# Patient Record
Sex: FEMALE | Race: WHITE
[De-identification: names, ages, dates, MRNs, and addresses within clinical notes are randomized per-mention and may not be internally consistent; named-entity substitution may affect disease eponyms.]

---

## 2017-04-07 ENCOUNTER — HOSPITAL ENCOUNTER (EMERGENCY)
Dept: HOSPITAL 74 - JER | Age: 62
LOS: 1 days | Discharge: HOME | End: 2017-04-08
Payer: COMMERCIAL

## 2017-04-07 VITALS — TEMPERATURE: 98.4 F | DIASTOLIC BLOOD PRESSURE: 76 MMHG | HEART RATE: 92 BPM | SYSTOLIC BLOOD PRESSURE: 144 MMHG

## 2017-04-07 VITALS — BODY MASS INDEX: 42 KG/M2

## 2017-04-07 DIAGNOSIS — E78.00: ICD-10-CM

## 2017-04-07 DIAGNOSIS — J45.909: Primary | ICD-10-CM

## 2017-04-07 DIAGNOSIS — I48.91: ICD-10-CM

## 2017-04-07 DIAGNOSIS — F32.9: ICD-10-CM

## 2017-04-07 DIAGNOSIS — Z79.01: ICD-10-CM

## 2017-04-07 DIAGNOSIS — I10: ICD-10-CM

## 2017-04-07 PROCEDURE — 3E0333Z INTRODUCTION OF ANTI-INFLAMMATORY INTO PERIPHERAL VEIN, PERCUTANEOUS APPROACH: ICD-10-PCS | Performed by: EMERGENCY MEDICINE

## 2017-04-07 PROCEDURE — 3E0F7GC INTRODUCTION OF OTHER THERAPEUTIC SUBSTANCE INTO RESPIRATORY TRACT, VIA NATURAL OR ARTIFICIAL OPENING: ICD-10-PCS | Performed by: EMERGENCY MEDICINE

## 2017-04-07 NOTE — PDOC
History of Present Illness





- General


History Source: Patient, Family


Exam Limitations: No Limitations





- History of Present Illness


Initial Comments: 


04/08/17 00:21


The patient is a 61 year old female with a significant past medical history of 

asthma, Afib, hypertension, and hypercholesterolemia, presenting to the 

Emergency Department with shortness of breath, and nonproductive cough. The 

patients daughter reports that she believes the patient is having an asthma 

attack. The patients daughter reports that the patient ran out of her 

medications of Symbicort and Ventolin. The patient also reports a low grade 

fever, and upper back pain when coughing. The patient admits she is having 

difficulty speaking due to her symptoms.


 


The patient denies nausea, vomiting, and diarrhea. Patient denies body aches, 

or chills. Patient denies chest pain, palpitations, and diaphoresis. Patient 

denies neck pain, or back pain. Patient denies dysuria, urinary frequency, and 

urgency. 





Allergies: Lasix





<Elayne Ruiz - Last Filed: 04/08/17 00:21>





<Patricia Cueva - Last Filed: 04/08/17 03:58>





- General


Chief Complaint: Shortness of Breath


Stated Complaint: ASTHMA


Time Seen by Provider: 04/07/17 23:31





Past History





<Elayne Ruiz - Last Filed: 04/08/17 00:21>





- Past Medical History


Asthma: Yes


Cardiac Disorders: Yes (afib)


HTN: Yes


Hypercholesterolemia: Yes


Psychiatric Problems: Yes (depression)





- Surgical History


Cardiac Surgery: Yes


Cholecystectomy: Yes





- Psycho/Social/Smoking Cessation Hx


Anxiety: No


Suicidal Ideation: No


Smoking History: Never smoked


Have you smoked in the past 12 months: No


Number of Cigarettes Smoked Daily: 6


If you are a former smoker, when did you quit?: 1986


Information on smoking cessation initiated: No


'Breaking Loose' booklet given: 11/01/16


Hx Alcohol Use: No


Drug/Substance Use Hx: No


Substance Use Type: None





<Patricia Cueva - Last Filed: 04/08/17 03:58>





- Past Medical History


Allergies/Adverse Reactions: 


 Allergies











Allergy/AdvReac Type Severity Reaction Status Date / Time


 


furosemide [From Lasix] Allergy Intermediate Itching Verified 04/07/17 22:46











Home Medications: 


Ambulatory Orders





Citalopram Hydrobromide [Celexa -] 10 mg PO DAILY 03/06/17 


Clonazepam [Klonopin -] 0.5 mg PO DAILY MDD 2 03/06/17 


Spironolactone [Aldactone -] 25 mg PO DAILY 03/06/17 


Verapamil HCl [Verapamil ER Pm] 300 mg PO DAILY 03/06/17 


Rivaroxaban [Xarelto] 1 each PO DAILY 04/07/17 


Albuterol 0.083% Nebulizer Sol [Ventolin 0.083% Nebulizer Soln -] 1 neb NEB Q6H 

#40 vial 04/08/17 


Albuterol 0.083% Nebulizer Sol [Ventolin 0.083% Nebulizer Soln -] 1 neb NEB Q6H 

#40 vial 04/08/17 


Albuterol Sulfate [Proair Respiclick] 90 mcg IH Q4H 04/08/17 


Budesonide/Formeterol Fumarate [SYMBICORT 160/4.5mcg -] 1 puff IH DAILY 04/08/ 17 


Prednisone [Deltasone -] 40 mg PO UTDICT #10 tablet 04/08/17 











**Review of Systems





- Review of Systems


Able to Perform ROS?: Yes


Comments:: 


04/08/17 00:22


GENERAL/CONSTITUTIONAL: No fever or chills. No weakness.


HEAD, EYES, EARS, NOSE AND THROAT: No change in vision. No ear pain or 

discharge. No sore throat.


CARDIOVASCULAR: + shortness of breath. No chest pain.


RESPIRATORY: + cough, + wheezing. No hemoptysis.


GASTROINTESTINAL: No nausea, vomiting, diarrhea or constipation.


GENITOURINARY: No dysuria, frequency, or change in urination.


MUSCULOSKELETAL: + back pain with cough. No joint or muscle swelling or pain. 

No neck pain.


SKIN: No rash


NEUROLOGIC: No headache, vertigo, loss of consciousness, or change in strength/

sensation.


ENDOCRINE: No increased thirst. No abnormal weight change.


HEMATOLOGIC/LYMPHATIC: No anemia, easy bleeding, or history of blood clots.


ALLERGIC/IMMUNOLOGIC: No hives or skin allergy.





<Elayne Ruiz - Last Filed: 04/08/17 00:21>





*Physical Exam





- Vital Signs


 Last Vital Signs











Temp Pulse Resp BP Pulse Ox


 


 98.4 F   92 H  24   144/76   98 


 


 04/07/17 22:48  04/07/17 22:48  04/07/17 22:48  04/07/17 22:48  04/07/17 22:48














- Physical Exam


Comments: 


04/08/17 00:23


GENERAL: Awake, alert, and fully oriented, in mild to moderate respiratory 

distress


HEAD: No signs of trauma


EYES: PERRLA, EOMI, sclera anicteric, conjunctiva clear


ENT: Auricles normal inspection, hearing grossly normal, nares patent, 

oropharynx clear without exudates. Moist mucosa


NECK: Normal ROM, supple, no lymphadenopathy, JVD, or masses


LUNGS: Wheezing throughout bilaterally, but good air movement. 


HEART: Regular rate and rhythm, normal S1 and S2, no murmurs, rubs or gallops


ABDOMEN: Soft, nontender, normoactive bowel sounds.  No guarding, no rebound.  

No masses


EXTREMITIES: Normal range of motion, no edema.  No clubbing or cyanosis. No 

cords, erythema, or tenderness


NEUROLOGICAL: Cranial nerves II through XII grossly intact.  Normal speech, 

normal gait


SKIN: Warm, Dry, normal turgor, no rashes or lesions noted.





<Elayne Ruiz - Last Filed: 04/08/17 00:21>





- Vital Signs


 Last Vital Signs











Temp Pulse Resp BP Pulse Ox


 


 98.4 F   92 H  24   144/76   98 


 


 04/07/17 22:48  04/07/17 22:48  04/07/17 22:48  04/07/17 22:48  04/07/17 22:48














<Patricia Cueva - Last Filed: 04/08/17 03:58>





ED Treatment Course





- LABORATORY


CBC & Chemistry Diagram: 


 04/08/17 00:06





 04/08/17 00:06





<Elayne Ruiz - Last Filed: 04/08/17 00:21>





- LABORATORY


CBC & Chemistry Diagram: 


 04/08/17 00:06





 04/08/17 00:06





<Patricia Cueva - Last Filed: 04/08/17 03:58>





Medical Decision Making





- Medical Decision Making





04/08/17 03:49


Pt comes with SOB and asthma exacerbation; however, she is overweight and we 

want to make sure she has no cardiac issues.  Pt is so SOB, that she has been 

unable to eat and she has only been drinking. Pt was treated with solumedrol 

and duoneb and hydrated with NSS; She appears vastly improved. She has normal 

labs and CXR and her EKG demonstrates afib, she is being treated with blood 

thinners for her afib..  





<Patricia Cueva - Last Filed: 04/08/17 03:58>





*DC/Admit/Observation/Transfer





- Attestations


Scribe Attestion: 


04/08/17 00:24


Documentation prepared by Elayne Ruiz, acting as medical scribe for Patricia Cueva MD.





<Elayne Ruiz - Last Filed: 04/08/17 00:21>





- Discharge Dispostion


Admit: No





<Patricia Cueva - Last Filed: 04/08/17 03:58>


Diagnosis at time of Disposition: 


 Asthma





- Discharge Dispostion


Disposition: HOME


Condition at time of disposition: Improved





- Prescriptions


Prescriptions: 


Prednisone [Deltasone -] 40 mg PO UTDICT #10 tablet


Albuterol 0.083% Nebulizer Sol [Ventolin 0.083% Nebulizer Soln -] 1 neb NEB Q6H 

#40 vial


Albuterol 0.083% Nebulizer Sol [Ventolin 0.083% Nebulizer Soln -] 1 neb NEB Q6H 

#40 vial





- Referrals


Referrals: 


Milady Cline [Primary Care Provider] - 





- Patient Instructions


Printed Discharge Instructions:  [facility] Ast[facility] -- Adult

## 2017-04-08 LAB
ALBUMIN SERPL-MCNC: 3.9 G/DL (ref 3.4–5)
ALP SERPL-CCNC: 105 U/L (ref 45–117)
ALT SERPL-CCNC: 21 U/L (ref 12–78)
ANION GAP SERPL CALC-SCNC: 13 MMOL/L (ref 8–16)
APTT BLD: 34 SECONDS (ref 26.9–34.4)
AST SERPL-CCNC: 11 U/L (ref 15–37)
BASOPHILS # BLD: 0.2 % (ref 0–2)
BILIRUB SERPL-MCNC: 0.3 MG/DL (ref 0.2–1)
CALCIUM SERPL-MCNC: 9.4 MG/DL (ref 8.5–10.1)
CO2 SERPL-SCNC: 24 MMOL/L (ref 21–32)
CREAT SERPL-MCNC: 1.1 MG/DL (ref 0.55–1.02)
DEPRECATED RDW RBC AUTO: 15.9 % (ref 11.6–15.6)
EOSINOPHIL # BLD: 0 % (ref 0–4.5)
GLUCOSE SERPL-MCNC: 124 MG/DL (ref 74–106)
INR BLD: 1.16 (ref 0.82–1.09)
MCH RBC QN AUTO: 26.8 PG (ref 25.7–33.7)
MCHC RBC AUTO-ENTMCNC: 32.6 G/DL (ref 32–36)
MCV RBC: 82.3 FL (ref 80–96)
NEUTROPHILS # BLD: 87.9 % (ref 42.8–82.8)
PLATELET # BLD AUTO: 363 K/MM3 (ref 134–434)
PMV BLD: 7.8 FL (ref 7.5–11.1)
PROT SERPL-MCNC: 7.8 G/DL (ref 6.4–8.2)
PT PNL PPP: 12.8 SEC (ref 9.98–11.88)
TROPONIN I SERPL-MCNC: < 0.02 NG/ML (ref 0–0.05)
WBC # BLD AUTO: 12.3 K/MM3 (ref 4–10)

## 2017-04-08 NOTE — EKG
Test Reason : 

Blood Pressure : ***/*** mmHG

Vent. Rate : 091 BPM     Atrial Rate : 113 BPM

   P-R Int : 000 ms          QRS Dur : 088 ms

    QT Int : 398 ms       P-R-T Axes : 000 050 056 degrees

   QTc Int : 489 ms

 

ATRIAL FIBRILLATION WITH PREMATURE VENTRICULAR OR ABERRANTLY CONDUCTED

COMPLEXES



ABNORMAL ECG

WHEN COMPARED WITH ECG OF 06-MAR-2017 14:05,

NONSPECIFIC T WAVE ABNORMALITY, WORSE IN LATERAL LEADS

Confirmed by KELLIE DIALLO, NENO ([id number]) on 4/8/2017 4:19:35 PM

 

Referred By:             Confirmed By:NENO HOPKINS MD

## 2017-04-10 ENCOUNTER — HOSPITAL ENCOUNTER (INPATIENT)
Dept: HOSPITAL 74 - JER | Age: 62
LOS: 9 days | Discharge: HOME | DRG: 141 | End: 2017-04-19
Attending: INTERNAL MEDICINE | Admitting: INTERNAL MEDICINE
Payer: COMMERCIAL

## 2017-04-10 VITALS — BODY MASS INDEX: 42.9 KG/M2

## 2017-04-10 DIAGNOSIS — I48.0: ICD-10-CM

## 2017-04-10 DIAGNOSIS — J45.901: Primary | ICD-10-CM

## 2017-04-10 DIAGNOSIS — I11.0: ICD-10-CM

## 2017-04-10 DIAGNOSIS — Z87.891: ICD-10-CM

## 2017-04-10 DIAGNOSIS — E66.9: ICD-10-CM

## 2017-04-10 DIAGNOSIS — I25.10: ICD-10-CM

## 2017-04-10 DIAGNOSIS — E78.5: ICD-10-CM

## 2017-04-10 DIAGNOSIS — I50.32: ICD-10-CM

## 2017-04-10 DIAGNOSIS — J45.909: ICD-10-CM

## 2017-04-10 LAB
ALBUMIN SERPL-MCNC: 3.9 G/DL (ref 3.4–5)
ALP SERPL-CCNC: 94 U/L (ref 45–117)
ALT SERPL-CCNC: 26 U/L (ref 12–78)
ANION GAP SERPL CALC-SCNC: 15 MMOL/L (ref 8–16)
AST SERPL-CCNC: 20 U/L (ref 15–37)
BASOPHILS # BLD: 0.1 % (ref 0–2)
BILIRUB SERPL-MCNC: 0.4 MG/DL (ref 0.2–1)
CALCIUM SERPL-MCNC: 9.1 MG/DL (ref 8.5–10.1)
CO2 SERPL-SCNC: 21 MMOL/L (ref 21–32)
COCKROFT - GAULT: 88.13
CREAT SERPL-MCNC: 1.2 MG/DL (ref 0.55–1.02)
DEPRECATED RDW RBC AUTO: 15.9 % (ref 11.6–15.6)
EOSINOPHIL # BLD: 0 % (ref 0–4.5)
GLUCOSE SERPL-MCNC: 120 MG/DL (ref 74–106)
MCH RBC QN AUTO: 26.8 PG (ref 25.7–33.7)
MCHC RBC AUTO-ENTMCNC: 33 G/DL (ref 32–36)
MCV RBC: 81.2 FL (ref 80–96)
NEUTROPHILS # BLD: 89.8 % (ref 42.8–82.8)
PLATELET # BLD AUTO: 392 K/MM3 (ref 134–434)
PMV BLD: 7.4 FL (ref 7.5–11.1)
PROT SERPL-MCNC: 7.8 G/DL (ref 6.4–8.2)
TROPONIN I SERPL-MCNC: < 0.02 NG/ML (ref 0–0.05)
TROPONIN I SERPL-MCNC: < 0.02 NG/ML (ref 0–0.05)
WBC # BLD AUTO: 13.5 K/MM3 (ref 4–10)

## 2017-04-10 RX ADMIN — IPRATROPIUM BROMIDE AND ALBUTEROL SULFATE SCH AMP: .5; 3 SOLUTION RESPIRATORY (INHALATION) at 06:50

## 2017-04-10 RX ADMIN — SPIRONOLACTONE SCH: 25 TABLET, FILM COATED ORAL at 22:30

## 2017-04-10 RX ADMIN — IPRATROPIUM BROMIDE AND ALBUTEROL SULFATE SCH AMP: .5; 3 SOLUTION RESPIRATORY (INHALATION) at 23:56

## 2017-04-10 RX ADMIN — BUDESONIDE AND FORMOTEROL FUMARATE DIHYDRATE SCH: 160; 4.5 AEROSOL RESPIRATORY (INHALATION) at 23:23

## 2017-04-10 RX ADMIN — IPRATROPIUM BROMIDE AND ALBUTEROL SULFATE SCH: .5; 3 SOLUTION RESPIRATORY (INHALATION) at 18:26

## 2017-04-10 RX ADMIN — IPRATROPIUM BROMIDE AND ALBUTEROL SULFATE SCH AMP: .5; 3 SOLUTION RESPIRATORY (INHALATION) at 06:15

## 2017-04-10 RX ADMIN — IPRATROPIUM BROMIDE AND ALBUTEROL SULFATE SCH AMP: .5; 3 SOLUTION RESPIRATORY (INHALATION) at 06:25

## 2017-04-10 RX ADMIN — METHYLPREDNISOLONE SODIUM SUCCINATE SCH MG: 125 INJECTION, POWDER, FOR SOLUTION INTRAMUSCULAR; INTRAVENOUS at 18:25

## 2017-04-10 RX ADMIN — IPRATROPIUM BROMIDE AND ALBUTEROL SULFATE SCH AMP: .5; 3 SOLUTION RESPIRATORY (INHALATION) at 06:35

## 2017-04-10 RX ADMIN — MONTELUKAST SODIUM SCH MG: 10 TABLET, COATED ORAL at 22:29

## 2017-04-10 NOTE — CON.CARD
Consult


Consult Specialty:: Cardiology


Referred by:: ER


Reason for Consultation:: SOB, possible CHF





- History of Present Illness


Chief Complaint: SOB


History of Present Illness: 


61 year old woman with a history of chronic asthma, chronic diastolic CHF, 

paroxysmal atrial fibrillation, HTN, HLD, non-obstructive CAD on cardiac cath 1/ 2014, h/o of an episode of possible myocarditis, admitted with sob. Pt seen and 

examined in the ER, sob at that time, despite having recently received 

nebulizer tx. denies any chest pain, no palpitations. no pnd, orthopnea. no 

current LE edema. 





- History Source


History Provided By: Patient, Family Member, Medical Record


Limitations to Obtaining History: No Limitations





- Past Medical History


Cardio/Vascular: Yes: AFIB, CAD, CHF, HTN, Hyperlipdemia


Pulmonary: Yes: Asthma


Gastrointestinal: Yes: Peptic Ulcer Disease


Psych: Yes: Bipolar





- Alcohol/Substance Use


Hx Alcohol Use: No





- Smoking History


Smoking history: Former smoker


Have you smoked in the past 12 months: No


Aproximately how many cigarettes per day: 6


If you are a former smoker, when did you quit?: 1986





- Social History


Usual Living Arrangement: With Child


ADL: Independent


History of Recent Travel: No





Home Medications





- Allergies


Allergies/Adverse Reactions: 


 Allergies











Allergy/AdvReac Type Severity Reaction Status Date / Time


 


furosemide [From Lasix] Allergy Intermediate Itching Verified 04/10/17 06:33














- Home Medications


Home Medications: 


Ambulatory Orders





Citalopram Hydrobromide [Celexa -] 10 mg PO DAILY 03/06/17 


Clonazepam [Klonopin -] 0.5 mg PO DAILY MDD 2 03/06/17 


Spironolactone [Aldactone -] 25 mg PO DAILY 03/06/17 


Verapamil HCl [Verapamil ER Pm] 300 mg PO DAILY 03/06/17 


Rivaroxaban [Xarelto] 1 each PO DAILY 04/07/17 


Albuterol 0.083% Nebulizer Sol [Ventolin 0.083% Nebulizer Soln -] 1 neb NEB Q6H 

#40 vial 04/08/17 


Albuterol Sulfate [Proair Respiclick] 90 mcg IH Q4H 04/08/17 


Budesonide/Formeterol Fumarate [SYMBICORT 160/4.5mcg -] 1 puff IH DAILY 04/08/ 17 


Prednisone [Deltasone -] 40 mg PO UTDICT #10 tablet 04/08/17 











Family Disease History





- Family Disease History


Family History: Denies





Review of Systems





- Review of Systems


Constitutional: reports: Malaise, Weakness


Eyes: denies: No Symptoms, Blind Spots, Blurred Vision, Double Vision, Eye Pain

, Floaters, Photophobia, Recent Change in Vision, Other


HENT: denies: No Symptoms, Difficult Swallowing, Ear Discharge, Ear Pain, 

Epistaxis, Gingival Bleeding, Hearing Loss, Mouth Swelling, Nasal Congestion, 

Ocular Prosthesis, Throat Pain, Toothache, Ringing in Ears, Other


Neck: denies: No Symptoms, Decreased ROM, Lumps, Pain on Movement, Stiffness, 

Swollen Glands, Tenderness, Other


Cardiovascular: reports: Shortness of Breath.  denies: No Symptoms, Chest Pain, 

Edema, Palpitations, Other


Respiratory: reports: Cough, Exercise Intolerance, SOB, SOB on Exertion, 

Wheezing.  denies: No Symptoms, Hemoptysis, Orthopnea, PND, Snoring, Other


Gastrointestinal: denies: No Symptoms, Abdominal Pain, Bloating, Constipation, 

Diarrhea, Dysphagia, Indigestion, Melena, Nausea, Rectal Bleeding, Vomiting, 

Vomiting Blood, Other


Genitourinary: denies: No Symptoms, Burning, Discharge, Dysuria, Flank Pain, 

Frequency, Hematuria, Incontinence, Lesions, Menses, Pain, Testicular Mass, 

Testicular Pain, Testicular Swelling, Urgency, Vaginal Bleeding, Other


Breasts: denies: No Symptoms Reported, See HPI, Breast Implants, Discharge from 

Nipple, Lumps, Pain, Skin Changes, Other


Musculoskeletal: denies: No Symptoms, Back Pain, Crepitus, Decreased ROM, 

Extremity Pain, Joint Pain, Joint Swelling, Muscle Pain, Muscle Cramps, Muscle 

Weakness, Other


Integumentary: denies: No Symptoms, Blister, Bruising, Change in Color, Eczema, 

Erythema, Incision, Lesions, Lump, Pallor, Pruritis, Rash, Wound, Other


Neurological: denies: No Symptoms, Change in LOC, Change in Speech, Confusion, 

Dizziness, Headache, Incoordination, Numbness, Parasthesia, Pre-Existing Deficit

, Seizure, Syncope, Tremors, Unsteady Gait, Weakness, Other


Endocrine: denies: No Symptoms, Excessive Sweating, Flushing, Increased Hunger, 

Increased Thirst, Intolerance to Cold, Intolerance to Heat, Unexplained Weight 

Gain, Unexplained Weight Loss, Other


Hematology/Lymphatic: denies: No Symptoms, Easily Bruised, Excessive Bleeding, 

Swollen Glands, Other


Psychiatric: denies: No Symptoms, Altered Sleep Pattern, Anxiety, Depression, 

Hallucinations, Panic, Paranoia, Suicidal, Other





- Risk Factors


Known Risk Factors: Yes: Hypercholesterolemia, Hypertension


Vital Signs: 


 Vital Signs











Temperature  98.7 F   04/10/17 05:40


 


Pulse Rate  105 H  04/10/17 09:40


 


Respiratory Rate  19   04/10/17 09:40


 


Blood Pressure  107/65   04/10/17 09:40


 


O2 Sat by Pulse Oximetry (%)  99   04/10/17 09:40











Constitutional: Yes: Calm, Mild Distress, Obese


Eyes: Yes: WNL, Conjunctiva Clear, EOM Intact, PERRL


HENT: Yes: WNL, Atraumatic, Normocephalic


Neck: Yes: WNL, Supple, Trachea Midline


Respiratory: Yes: Regular, Cough, Rhonchi, SOB, Wheezes.  No: Rales


Gastrointestinal: Yes: WNL, Normal Bowel Sounds, Soft.  No: Distention, 

Tenderness


Renal/: Yes: WNL


Cardiovascular: Yes: Regular Rate and Rhythm.  No: Bradycardia, Tachycardia, 

Pulse Irregular, Gallop, Rub, Varicosities


JVD: No


Carotid Bruit: No


PMI: Non-Displaced


Heart Sounds: Yes: S1, S2.  No: Split S2, S3, S4, Clicks, Gallop, Rub, Bruit


Murmur: No: Systolic Murmur, Diastolic Murmur


Musculoskeletal: Yes: WNL


Extremities: Yes: WNL


Edema: No


Peripheral Pulses WNL: Yes


Peripheral Pulses: 2+ Left Doralis Pedis, 2+ Right Dorsalis Pedis


Integumentary: Yes: WNL


Neurological: Yes: WNL, Alert, Oriented, Cran Nerves II-XII Intact


...Motor Strength: WNL


Psychiatric: Yes: WNL, Alert, Oriented





- Other Data


Labs, Other Data: 


 Laboratory Tests











  04/10/17 04/10/17 04/10/17





  06:25 06:25 06:25


 


WBC   13.5 H 


 


Hgb   11.2 


 


Hct   34.0 


 


Plt Count   392 


 


Sodium  135 L  


 


Potassium  4.3  


 


Chloride  99  


 


Carbon Dioxide  21  


 


BUN  21 H  


 


Creatinine  1.2 H  


 


Random Glucose  120 H  


 


Calcium  9.1  


 


Total Bilirubin  0.4  D  


 


AST  20  D  


 


ALT  26  D  


 


Alkaline Phosphatase  94  


 


Creatine Kinase   


 


CK-MB (CK-2)   


 


Troponin I   


 


B-Natriuretic Peptide    1398.43 H


 


Total Protein  7.8  


 


Albumin  3.9  














  04/10/17





  06:25


 


WBC 


 


Hgb 


 


Hct 


 


Plt Count 


 


Sodium 


 


Potassium 


 


Chloride 


 


Carbon Dioxide 


 


BUN 


 


Creatinine 


 


Random Glucose 


 


Calcium 


 


Total Bilirubin 


 


AST 


 


ALT 


 


Alkaline Phosphatase 


 


Creatine Kinase  182  D


 


CK-MB (CK-2)  2.256


 


Troponin I  < 0.02


 


B-Natriuretic Peptide 


 


Total Protein 


 


Albumin 











ekg-afib with rVR 120bpm, T inversions V1-V4 possible anterior ischemia, no sig 

change in T wave abnormalities compared to prior ekgs





Echo: Report Reviewed, Image Reviewed


Prior Cardiac Procedures: Cardiac Catheterization





Imaging





- Results


Chest X-ray: Report Reviewed, Image Reviewed


EKG: Report Reviewed, Image Reviewed


Other: Report Reviewed, Image Reviewed





Assessment/Plan


61 year old woman with a history of chronic asthma, chronic diastolic CHF, 

paroxysmal atrial fibrillation, HTN, HLD, non-obstructive CAD on cardiac cath 1/ 2014, h/o of an episode of possible myocarditis, admitted with sob.





SOB-wheezing and rhonchi on exam


-likely exacerbation of asthma


-no pulmonary edema on exam or imaging, sob less likely due to Acute on chronic 

diastolic CHF, although it is possible that her afib with rVR precipitated a 

mild degree of flash pulm edema superimposed on her current asthma exacerbation


-hold off on diuresis for now, tx for asthma exacerbation


-consider pulmonary evaluation


-f/up echo that was ordered for today


-past cardiac work up as below:





Cardiac cath 01/14/2014 [facility]  Nonobstructive coronary artery disease, 

RCA no obstruction, left main no obstruction, mid LAD less than 30%, myocardial 

bridge, left circumflex no obstruction, LVEDP is markedly elevated at 30 mmHg.


Echo 06/08/2016  Normal left ventricular size and function, normal right 

ventricular size and function, no pericardial effusion, mild LVH, insufficient 

TR to measure pressure.


Stress 07/05/2016  Exercise nuclear stress test, performed 4 minutes of Carlos Alberto 

protocol.  Target heart rate was achieved.  Stress ECG was negative, perfusion 

imaging revealed normal perfusion, no evidence of stress-induced ischemia, LVEF 

55%.


Cardiac MRI [facility] 04/15/2014  Normal biventricular size and systolic 

function with no evidence of scar necrosis or edema, moderate mitral 

regurgitation. 





Atrial fibrillation-paroxysmal with RVR


-resume home Verapamil


-did not tolerate bblockers or cardizem in the past


-if needed can use IV cardizem prn for HR control


-at last visit in office pt was on Coumadin but it seems she was seen by 

another doctor more recently and started on Xarelto


-confirm home medication and resume AC





CAD-non-obstructive as above, unlikely active issue


-no chest pain


-ekg abnormal but the abnormalities are chronic, unchanged from prior ekgs


-cont home ASA 81mg daily


-did not tolerate bblocker in the past





HTN-adequately controlled


-cont home medical regimen

## 2017-04-10 NOTE — PDOC
History of Present Illness





- General


Chief Complaint: Respiratory


Stated Complaint: DIFFICULTY BREATHING


Time Seen by Provider: 04/10/17 05:44


History Source: Patient, Family


Exam Limitations: No Limitations





- History of Present Illness


Initial Comments: 





04/10/17 07:05





62yo Female patient w/ PmHx HTN, HLD, Asthma, and Afib presents to ED c/o diff 

breathing, SOB. Patient states she was seen and evaluated in this ED 2 days ago

, discharged with INH and prednisone for symptoms management. Patient returns 

stating she is not getting any better and symptoms have worsened. Patient 

denies CP, Abd pain, Back pain, rash, fever, or any other complaints at this 

time.


Timing/Duration: reports: week


Severity: reports: moderate


Possible Cause: Yes: occasional episodes


Modifying Factors: improves with: albuterol inhaler, albuterol nebulizer


Associated Symptoms: reports: cough, shortness of breath, wheezing.  denies: 

denies symptoms, chest pain/soreness, dizziness, earache, facial pain, fever/

chills, headache, lightheadedness, muscle aches, nasal congestion, nasal 

drainage, sinus infection, sore throat, other





Past History





- Travel


Traveled outside of the country in the last 30 days: No


Close contact w/someone who was outside of country & ill: No





- Past Medical History


Allergies/Adverse Reactions: 


 Allergies











Allergy/AdvReac Type Severity Reaction Status Date / Time


 


furosemide [From Lasix] Allergy Intermediate Itching Verified 04/10/17 06:33











Home Medications: 


Ambulatory Orders





Citalopram Hydrobromide [Celexa -] 10 mg PO DAILY 03/06/17 


Clonazepam [Klonopin -] 0.5 mg PO DAILY MDD 2 03/06/17 


Spironolactone [Aldactone -] 25 mg PO DAILY 03/06/17 


Verapamil HCl [Verapamil ER Pm] 300 mg PO DAILY 03/06/17 


Rivaroxaban [Xarelto] 1 each PO DAILY 04/07/17 


Albuterol 0.083% Nebulizer Sol [Ventolin 0.083% Nebulizer Soln -] 1 neb NEB Q6H 

#40 vial 04/08/17 


Albuterol Sulfate [Proair Respiclick] 90 mcg IH Q4H 04/08/17 


Budesonide/Formeterol Fumarate [SYMBICORT 160/4.5mcg -] 1 puff IH DAILY 04/08/ 17 


Prednisone [Deltasone -] 40 mg PO UTDICT #10 tablet 04/08/17 








Asthma: Yes


Cardiac Disorders: Yes (afib)


HTN: Yes


Hypercholesterolemia: Yes


Psychiatric Problems: Yes (depression)





- Surgical History


Cardiac Surgery: Yes


Cholecystectomy: Yes





- Psycho/Social/Smoking Cessation Hx


Anxiety: No


Suicidal Ideation: No


Smoking History: Former smoker


Have you smoked in the past 12 months: No


Number of Cigarettes Smoked Daily: 6


If you are a former smoker, when did you quit?: 1986


Information on smoking cessation initiated: No


'Breaking Loose' booklet given: 11/01/16


Hx Alcohol Use: No


Drug/Substance Use Hx: No


Substance Use Type: None





Respiratory Specific PMHX





- Complaint Specific PMHX


Angina: No


Bronchitis: No


Pneumonia: No


Pulmonary Embolus: No


TB (Tuberculosis): No





**Review of Systems





- Review of Systems


Able to Perform ROS?: Yes


Is the patient limited English proficient: No


Constitutional: No: Chills, Fever


Respiratory: Yes: Cough, Shortness of Breath.  No: Stridor, Wheezing, 

Productive cough, Hemoptysis


Cardiac (ROS): No: Chest Pain, Lightheadedness, Palpitations, Syncope, Chest 

Tightness


ABD/GI: No: Constipated, Diarrhea, Nausea, Poor Appetite, Poor Fluid Intake, 

Vomiting, Abdominal cramping


: No: Burning, Dysuria, Frequency, Flank Pain, Hematuria, Pain


Musculoskeletal: No: Back Pain


Integumentary: No: Erythema, Rash, Sweating


Neurological: No: Headache, Seizure, Ataxia, Dizziness


All Other Systems: Reviewed and Negative





*Physical Exam





- Vital Signs


 Last Vital Signs











Temp Pulse Resp BP Pulse Ox


 


 98.7 F   117 H  26 H  116/98   100 


 


 04/10/17 05:40  04/10/17 05:40  04/10/17 05:40  04/10/17 05:40  04/10/17 05:40














- Physical Exam


General Appearance: Yes: Nourished, Appropriately Dressed, Moderate Distress.  

No: Apparent Distress, Mild Distress, Severe Distress


HEENT: positive: EOMI, TK, Normal ENT Inspection, Normal Voice, Symmetrical, 

TMs Normal, Pharynx Normal.  negative: Pharyngeal Erythema, Tonsillar Exudate, 

Tonsillar Erythema, Nasal Congestion, Rhinorrhea, Sinus Tenderness, TM Bulging, 

TM Dull, TM Erythema


Neck: positive: Trachea midline, Supple.  negative: Stridor, Lymphadenopathy (R)

, Lymphadenopathy (L)


Respiratory/Chest: positive: Rapid RR, Rales, Wheezing.  negative: Respiratory 

Distress, Accessory Muscle Use, Labored Respiration


Cardiovascular: positive: Tachycardia, Irregularly Irregular


Gastrointestinal/Abdominal: positive: Normal Bowel Sounds, Soft, Distended.  

negative: Guarding, Rebound, Tenderness


Musculoskeletal: positive: Normal Inspection.  negative: CVA Tenderness


Extremity: positive: Normal Capillary Refill, Normal Inspection, Normal Range 

of Motion


Integumentary: positive: Normal Color, Dry, Warm


Neurologic: positive: CNs II-XII NML intact, Fully Oriented, Alert, Normal Mood/

Affect, Normal Response, Motor Strength 5/5





ED Treatment Course





- LABORATORY


CBC & Chemistry Diagram: 


 04/10/17 06:25





 04/10/17 06:25

## 2017-04-10 NOTE — HP
Admitting History and Physical





- Primary Care Physician


PCP: Kush Drake





- Admission


Chief Complaint: chest pain, sob


History of Present Illness: 


-  61 year old female presents to ER with c/o SOB, mild chest pain, asthma 

symptoms, wheezing. she was initially seen 2 days ago in ER with asthma/COPD 

exacerbation where she was treated with steriods and inhalers and discharged to 

home.  Now, pt and daughter at bedside states she feels worse then 2 days ago. 








- Past Medical History


Cardiovascular: Yes: AFIB, CAD, CHF, HTN, Hyperlipdemia


Pulmonary: Yes: Asthma


Gastrointestinal: Yes: Peptic Ulcer Disease


Psych: Yes: Bipolar





- Smoking History


Smoking history: Former smoker


Have you smoked in the past 12 months: No


Aproximately how many cigarettes per day: 6


If you are a former smoker, when did you quit?: 1986





- Alcohol/Substance Use


Hx Alcohol Use: No





- Social History


ADL: Independent


History of Recent Travel: No





Home Medications





- Allergies


Allergies/Adverse Reactions: 


 Allergies











Allergy/AdvReac Type Severity Reaction Status Date / Time


 


furosemide [From Lasix] Allergy Intermediate Itching Verified 04/10/17 06:33














- Home Medications


Home Medications: 


Ambulatory Orders





Citalopram Hydrobromide [Celexa -] 10 mg PO DAILY 03/06/17 


Clonazepam [Klonopin -] 0.5 mg PO DAILY MDD 2 03/06/17 


Spironolactone [Aldactone -] 25 mg PO DAILY 03/06/17 


Verapamil HCl [Verapamil ER Pm] 300 mg PO DAILY 03/06/17 


Rivaroxaban [Xarelto] 1 each PO DAILY 04/07/17 


Albuterol 0.083% Nebulizer Sol [Ventolin 0.083% Nebulizer Soln -] 1 neb NEB Q6H 

#40 vial 04/08/17 


Albuterol Sulfate [Proair Respiclick] 90 mcg IH Q4H 04/08/17 


Budesonide/Formeterol Fumarate [SYMBICORT 160/4.5mcg -] 1 puff IH DAILY 04/08/ 17 


Prednisone [Deltasone -] 40 mg PO UTDICT #10 tablet 04/08/17 











Review of Systems





- Review of Systems


Cardiovascular: reports: Chest Pain


Respiratory: reports: SOB





Physical Examination


Vital Signs: 


 Vital Signs











Temperature  98.2 F   04/10/17 17:40


 


Pulse Rate  119 H  04/10/17 17:40


 


Respiratory Rate  17   04/10/17 17:40


 


Blood Pressure  133/90   04/10/17 17:40


 


O2 Sat by Pulse Oximetry (%)  98   04/10/17 17:40











Constitutional: Yes: No Distress


HENT: Yes: Atraumatic


Neck: Yes: Supple


Cardiovascular: Yes: Regular Rate and Rhythm


Respiratory: Yes: Rhonchi


Gastrointestinal: Yes: Normal Bowel Sounds


Extremities: Yes: WNL


Neurological: Yes: Alert, Oriented





Problem List





- Problems


(1) Acute asthma exacerbation


Assessment/Plan: 


duo nebs


iv steroids


Code(s): J45.901 - UNSPECIFIED ASTHMA WITH (ACUTE) EXACERBATION   





(2) CAD (coronary artery disease)


Code(s): I25.10 - ATHSCL HEART DISEASE OF NATIVE CORONARY ARTERY W/O ANG PCTRS 

  





(3) Chest pain


Code(s): R07.9 - CHEST PAIN, UNSPECIFIED   





(4) CHF exacerbation


Code(s): I50.9 - HEART FAILURE, UNSPECIFIED   Qualifiers: 


     Congestive heart failure type: unspecified congestive heart failure type  

   Qualified Code(s): I50.9 - Heart failure, unspecified  





(5) HLD (hyperlipidemia)


Code(s): E78.5 - HYPERLIPIDEMIA, UNSPECIFIED   





(6) HTN (hypertension)


Code(s): I10 - ESSENTIAL (PRIMARY) HYPERTENSION   








Assessment/Plan


 Laboratory Tests











  04/10/17 04/10/17 04/10/17





  06:25 06:25 06:25


 


WBC   13.5 H 


 


RBC   4.19 


 


Hgb   11.2 


 


Hct   34.0 


 


MCV   81.2 


 


MCHC   33.0 


 


RDW   15.9 H 


 


Plt Count   392 


 


MPV   7.4 L 


 


Neutrophils %   89.8 H 


 


Lymphocytes %   8.0 


 


Monocytes %   2.1 L 


 


Eosinophils %   0.0 


 


Basophils %   0.1 


 


Sodium  135 L  


 


Potassium  4.3  


 


Chloride  99  


 


Carbon Dioxide  21  


 


Anion Gap  15  


 


BUN  21 H  


 


Creatinine  1.2 H  


 


Creat Clearance w eGFR  45.67  


 


Random Glucose  120 H  


 


Calcium  9.1  


 


Total Bilirubin  0.4  D  


 


AST  20  D  


 


ALT  26  D  


 


Alkaline Phosphatase  94  


 


Creatine Kinase   


 


CK-MB (CK-2)   


 


Troponin I   


 


B-Natriuretic Peptide    1398.43 H


 


Total Protein  7.8  


 


Albumin  3.9  














  04/10/17





  06:25


 


WBC 


 


RBC 


 


Hgb 


 


Hct 


 


MCV 


 


MCHC 


 


RDW 


 


Plt Count 


 


MPV 


 


Neutrophils % 


 


Lymphocytes % 


 


Monocytes % 


 


Eosinophils % 


 


Basophils % 


 


Sodium 


 


Potassium 


 


Chloride 


 


Carbon Dioxide 


 


Anion Gap 


 


BUN 


 


Creatinine 


 


Creat Clearance w eGFR 


 


Random Glucose 


 


Calcium 


 


Total Bilirubin 


 


AST 


 


ALT 


 


Alkaline Phosphatase 


 


Creatine Kinase  182  D


 


CK-MB (CK-2)  2.256


 


Troponin I  < 0.02


 


B-Natriuretic Peptide 


 


Total Protein 


 


Albumin 








Active Medications











Generic Name Dose Route Start Last Admin





  Trade Name Freq  PRN Reason Stop Dose Admin


 


Albuterol Sulfate  1 amp 04/10/17 16:59  





  Ventolin 0.083% Nebulizer Soln -  NEB   





  Q4H PRN   





  SHORT OF BREATH/WHEEZING   


 


Albuterol/Ipratropium  1 amp 04/10/17 18:00 04/10/17 18:26





  Duoneb -  NEB   Not Given





  QIDR YU   


 


Methylprednisolone Sodium Succinate  60 mg 04/10/17 18:00 04/10/17 18:25





  Solu-Medrol -  IVPB   60 mg





  Q8H-IV YU   Administration


 


Montelukast Sodium  10 mg 04/10/17 22:00  





  Singulair -  PO   





  HS YU   








Assessment/Plan


61 year old woman with a history of chronic asthma, chronic diastolic CHF, 

paroxysmal atrial fibrillation, HTN, HLD, non-obstructive CAD on cardiac cath 1/ 2014, h/o of an episode of possible myocarditis, admitted with sob.





Acute Asthma Exacerbation


Atrial Fibrillation


CAD


LV Diastolic Dysfunction


HTN


Hyperlipidemia


CHF





PLAN


continue duo nebs


iv steroids


home meds


diuretics











-

## 2017-04-10 NOTE — PDOC
*Physical Exam





- Vital Signs


 Last Vital Signs











Temp Pulse Resp BP Pulse Ox


 


 98.7 F   117 H  26 H  116/98   100 


 


 04/10/17 05:40  04/10/17 05:40  04/10/17 05:40  04/10/17 05:40  04/10/17 05:40














- Physical Exam


General Appearance: Yes: Mild Distress


HEENT: positive: Normal Voice


Neck: positive: Trachea midline


Respiratory/Chest: positive: Accessory Muscle Use, Rales, Wheezing, Other (cough

)


Cardiovascular: positive: Tachycardia, Irregular.  negative: Edema


Musculoskeletal: positive: Normal Inspection


Extremity: positive: Normal Capillary Refill, Normal Inspection, Normal Range 

of Motion


Integumentary: positive: Normal Color, Dry, Warm


Neurologic: positive: Fully Oriented, Alert





**Heart Score/ECG Review





- History


History: Highly suspicious





- Electrocardiogram


EKG: Non specific repolarization disturbance





- Age


Age: 45-65





- Risk Factors


Risk Factors Heart Score: Yes Hx Hypercholesterolemia, Yes Hx Hypertension, Yes 

Smoking History, Yes Hx Obesity


Based on the list above the patient has:: >/=3 risk factors or Hx 

atherosclerotic disease





- Troponin


Troponin: </= normal limit





- Score


Heart Score - Total: 6





- ECG Intrepretation


Rhythm: Irregularly Irregular





ED Treatment Course





- LABORATORY


CBC & Chemistry Diagram: 


 04/10/17 06:25





 04/10/17 06:25





- ADDITIONAL ORDERS


Additional order review: 


 Laboratory  Results











  04/10/17 04/10/17 04/10/17





  06:25 06:25 06:25


 


Sodium    135 L


 


Potassium    4.3


 


Chloride    99


 


Carbon Dioxide    21


 


Anion Gap    15


 


BUN    21 H


 


Creatinine    1.2 H


 


Creat Clearance w eGFR    45.67


 


Random Glucose    120 H


 


Calcium    9.1


 


Total Bilirubin    0.4  D


 


AST    20  D


 


ALT    26  D


 


Alkaline Phosphatase    94


 


Creatine Kinase  182  D  


 


CK-MB (CK-2)  2.256  


 


Troponin I  < 0.02  


 


B-Natriuretic Peptide   1398.43 H 


 


Total Protein    7.8


 


Albumin    3.9








 











  04/10/17





  06:25


 


RBC  4.19


 


MCV  81.2


 


MCHC  33.0


 


RDW  15.9 H


 


MPV  7.4 L


 


Neutrophils %  89.8 H


 


Lymphocytes %  8.0


 


Monocytes %  2.1 L


 


Eosinophils %  0.0


 


Basophils %  0.1














04/10/17 08:02








- RADIOLOGY


Radiology Studies Ordered: 





04/10/17 08:02


Chest CT, neg for infiltrate or effusion





- Medications


Given in the ED: 


ED Medications














Discontinued Medications














Generic Name Dose Route Start Last Admin





  Trade Name Deborah  PRN Reason Stop Dose Admin


 


Albuterol/Ipratropium  1 amp 04/10/17 06:15 04/10/17 06:50





  Duoneb -  NEB 04/10/17 07:01  1 amp





  Q15M YU   Administration


 


Sodium Chloride  500 mls @ 500 mls/hr 04/10/17 06:01 04/10/17 06:23





  Normal Saline -  IV 04/10/17 07:00  500 mls/hr





  ASDIR STA   Administration


 


Methylprednisolone Sodium Succinate  125 mg 04/10/17 06:01 04/10/17 06:24





  Solu-Medrol -  IVPB 04/10/17 06:02  125 mg





  ONCE ONE   Administration














Medical Decision Making





- Medical Decision Making


04/10/17 08:03


A/P: 61 year old female presents to ER with c/o SOB, mild chest pain, asthma 

symptoms, wheezing. she was initially seen 2 days ago in ER with asthma/COPD 

exacerbation where she was treated with steriods and inhalers and discharged to 

home.  Now, pt and daughter at bedside states she feels worse then 2 days ago. 


Received report from night NP Clifton as workup was being completed. 





Labs noted:  elevated BNP noted. 


Chest CT without effusion or infiltrate


EKG: noted in AF rate 121, ST and T wave abnormalities in lateral leads. 





1.  Chest pain, elevated BNP


-  mg given


-Echo


-Admission


2.  SOB, wheezing


-duonebs continue PRN


-pulse ox


-oxygen as needed


3.  Admission 


-Dr. Alva (hospitalist coverage)


 





04/10/17 08:12








*DC/Admit/Observation/Transfer


Diagnosis at time of Disposition: 


 SOB (shortness of breath), Atrial fibrillation, Cough, Asthma





- Discharge Dispostion


Condition at time of disposition: Guarded


Admit: Yes





- Referrals


Referrals: 


Milady Cline [Primary Care Provider] -

## 2017-04-10 NOTE — EKG
Test Reason : 

Blood Pressure : ***/*** mmHG

Vent. Rate : 121 BPM     Atrial Rate : 127 BPM

   P-R Int : 000 ms          QRS Dur : 086 ms

    QT Int : 364 ms       P-R-T Axes : 000 053 268 degrees

   QTc Int : 516 ms

 

ATRIAL FIBRILLATION WITH RAPID VENTRICULAR RESPONSE



ABNORMAL ECG

WHEN COMPARED WITH ECG OF 08-APR-2017 02:19,

NO SIGNIFICANT CHANGE WAS FOUND

Confirmed by JEVON GONZALEZ MD ([id number]) on 4/10/2017 11:20:10 AM

 

Referred By:             Confirmed By:JEVON GONZALEZ MD

## 2017-04-10 NOTE — CON.PULM
Consult


Consult Specialty:: PULMONARY


Referred by:: Dr. Drake


Reason for Consultation:: shortness of breath





- History of Present Illness


Chief Complaint: shortness of breath


History of Present Illness: 


62yo female with h/o HTN, hyperlipidemia, CAD, atrial fibrillation, asthma 

since childhood, LV diastolic dysfunction, bipolar disorder who presents with 

worsening shortness of breath. She was seen in the ER 4/7, treated for asthma 

exacerbation, discharged on prednisone 20mg BID but without improvement. No 

fevers, chills or sweats. No sick contacts or recent travel. She did receive 

her flu vaccine this year. She is maintained on Symbicort and Albuterol at home

, has about 1 exacerbation per year requiring steroids. Does not know her best 

peak flow. No chest pain or palpitations. 








- History Source


History Provided By: Patient, Family Member, Medical Record


Limitations to Obtaining History: Language Barrier





- Past Medical History


Cardio/Vascular: Yes: AFIB, CAD, CHF, HTN, Hyperlipdemia


Pulmonary: Yes: Asthma


Gastrointestinal: Yes: Peptic Ulcer Disease


Psych: Yes: Bipolar





- Alcohol/Substance Use


Hx Alcohol Use: No





- Smoking History


Smoking history: Former smoker


Have you smoked in the past 12 months: No


Aproximately how many cigarettes per day: 6


If you are a former smoker, when did you quit?: 1986





- Social History


Usual Living Arrangement: With Child


ADL: Independent


History of Recent Travel: No





Home Medications





- Allergies


Allergies/Adverse Reactions: 


 Allergies











Allergy/AdvReac Type Severity Reaction Status Date / Time


 


furosemide [From Lasix] Allergy Intermediate Itching Verified 04/10/17 06:33














- Home Medications


Home Medications: 


Ambulatory Orders





Citalopram Hydrobromide [Celexa -] 10 mg PO DAILY 03/06/17 


Clonazepam [Klonopin -] 0.5 mg PO DAILY MDD 2 03/06/17 


Spironolactone [Aldactone -] 25 mg PO DAILY 03/06/17 


Verapamil HCl [Verapamil ER Pm] 300 mg PO DAILY 03/06/17 


Rivaroxaban [Xarelto] 1 each PO DAILY 04/07/17 


Albuterol 0.083% Nebulizer Sol [Ventolin 0.083% Nebulizer Soln -] 1 neb NEB Q6H 

#40 vial 04/08/17 


Albuterol Sulfate [Proair Respiclick] 90 mcg IH Q4H 04/08/17 


Budesonide/Formeterol Fumarate [SYMBICORT 160/4.5mcg -] 1 puff IH DAILY 04/08/ 17 


Prednisone [Deltasone -] 40 mg PO UTDICT #10 tablet 04/08/17 











Review of Systems





- Review of Systems


Constitutional: denies: Chills, Fever


Eyes: denies: Recent Change in Vision


HENT: denies: Nasal Congestion, Throat Pain


Neck: denies: Stiffness, Tenderness


Cardiovascular: reports: Shortness of Breath.  denies: Chest Pain, Edema, 

Palpitations


Respiratory: reports: Cough, Exercise Intolerance, SOB, SOB on Exertion, 

Wheezing.  denies: Hemoptysis


Gastrointestinal: denies: Abdominal Pain, Nausea, Vomiting


Genitourinary: denies: Dysuria, Hematuria


Neurological: denies: Dizziness, Headache





Physical Exam


Vital Sings: 


 Vital Signs











Temperature  98.9 F   04/10/17 13:40


 


Pulse Rate  102 H  04/10/17 13:40


 


Respiratory Rate  18   04/10/17 13:40


 


Blood Pressure  114/72   04/10/17 13:40


 


O2 Sat by Pulse Oximetry (%)  98   04/10/17 13:40











Constitutional: Yes: Calm, Other (frequent coughing)


Eyes: Yes: Conjunctiva Clear, EOM Intact


HENT: Yes: Atraumatic, Normocephalic


Neck: Yes: Supple, Trachea Midline


Cardiovascular: Yes: Regular Rate and Rhythm


Respiratory: Yes: Rhonchi, Wheezes


...Clubbing: No


Gastrointestinal: Yes: Normal Bowel Sounds, Soft, Abdomen, Obese.  No: 

Tenderness


Edema: No


Peripheral Pulses WNL: Yes





Imaging





- Results


Cat Scan: Report Reviewed, Image Reviewed (no infiltrates)





Problem List





- Problems


(1) Acute asthma exacerbation


Code(s): J45.901 - UNSPECIFIED ASTHMA WITH (ACUTE) EXACERBATION   





(2) Atrial fibrillation


Code(s): I48.91 - UNSPECIFIED ATRIAL FIBRILLATION   





(3) CAD (coronary artery disease)


Code(s): I25.10 - ATHSCL HEART DISEASE OF NATIVE CORONARY ARTERY W/O ANG PCTRS 

  





(4) HLD (hyperlipidemia)


Code(s): E78.5 - HYPERLIPIDEMIA, UNSPECIFIED   





(5) HTN (hypertension)


Code(s): I10 - ESSENTIAL (PRIMARY) HYPERTENSION   








Assessment/Plan


Acute Asthma Exacerbation


Atrial Fibrillation


CAD


LV Diastolic Dysfunction


HTN


Hyperlipidemia





-  IV medrol


-  inhaled bronchodilators standing and PRN


-  monitor daily peak flow


-  O2 as needed to keep SpO2 >90%


-  rate control


-  continue anticoagulation


-  will follow





Thank you for this consult


Luis Murillo MD

## 2017-04-11 LAB
ALBUMIN SERPL-MCNC: 3.5 G/DL (ref 3.4–5)
ALP SERPL-CCNC: 76 U/L (ref 45–117)
ALT SERPL-CCNC: 28 U/L (ref 12–78)
ANION GAP SERPL CALC-SCNC: 13 MMOL/L (ref 8–16)
AST SERPL-CCNC: 13 U/L (ref 15–37)
BASOPHILS # BLD: 0.1 % (ref 0–2)
BILIRUB SERPL-MCNC: 0.5 MG/DL (ref 0.2–1)
CALCIUM SERPL-MCNC: 8.6 MG/DL (ref 8.5–10.1)
CO2 SERPL-SCNC: 24 MMOL/L (ref 21–32)
COCKROFT - GAULT: 105.33
CREAT SERPL-MCNC: 1 MG/DL (ref 0.55–1.02)
DEPRECATED RDW RBC AUTO: 16.2 % (ref 11.6–15.6)
EOSINOPHIL # BLD: 0 % (ref 0–4.5)
GLUCOSE SERPL-MCNC: 130 MG/DL (ref 74–106)
MCH RBC QN AUTO: 27.2 PG (ref 25.7–33.7)
MCHC RBC AUTO-ENTMCNC: 33.2 G/DL (ref 32–36)
MCV RBC: 82 FL (ref 80–96)
NEUTROPHILS # BLD: 88.9 % (ref 42.8–82.8)
PLATELET # BLD AUTO: 321 K/MM3 (ref 134–434)
PMV BLD: 7.4 FL (ref 7.5–11.1)
PROT SERPL-MCNC: 6.8 G/DL (ref 6.4–8.2)
TROPONIN I SERPL-MCNC: < 0.02 NG/ML (ref 0–0.05)
WBC # BLD AUTO: 9.6 K/MM3 (ref 4–10)

## 2017-04-11 RX ADMIN — VERAPAMIL HYDROCHLORIDE SCH MG: 240 TABLET, FILM COATED, EXTENDED RELEASE ORAL at 11:57

## 2017-04-11 RX ADMIN — METHYLPREDNISOLONE SODIUM SUCCINATE SCH MG: 125 INJECTION, POWDER, FOR SOLUTION INTRAMUSCULAR; INTRAVENOUS at 17:38

## 2017-04-11 RX ADMIN — IPRATROPIUM BROMIDE AND ALBUTEROL SULFATE SCH AMP: .5; 3 SOLUTION RESPIRATORY (INHALATION) at 23:46

## 2017-04-11 RX ADMIN — SPIRONOLACTONE SCH MG: 25 TABLET, FILM COATED ORAL at 10:00

## 2017-04-11 RX ADMIN — BUDESONIDE AND FORMOTEROL FUMARATE DIHYDRATE SCH PUFF: 160; 4.5 AEROSOL RESPIRATORY (INHALATION) at 14:00

## 2017-04-11 RX ADMIN — VERAPAMIL HYDROCHLORIDE SCH MG: 240 TABLET, FILM COATED, EXTENDED RELEASE ORAL at 21:37

## 2017-04-11 RX ADMIN — METHYLPREDNISOLONE SODIUM SUCCINATE SCH MG: 125 INJECTION, POWDER, FOR SOLUTION INTRAMUSCULAR; INTRAVENOUS at 02:40

## 2017-04-11 RX ADMIN — IPRATROPIUM BROMIDE AND ALBUTEROL SULFATE SCH AMP: .5; 3 SOLUTION RESPIRATORY (INHALATION) at 18:09

## 2017-04-11 RX ADMIN — METHYLPREDNISOLONE SODIUM SUCCINATE SCH MG: 125 INJECTION, POWDER, FOR SOLUTION INTRAMUSCULAR; INTRAVENOUS at 10:01

## 2017-04-11 RX ADMIN — IPRATROPIUM BROMIDE AND ALBUTEROL SULFATE SCH AMP: .5; 3 SOLUTION RESPIRATORY (INHALATION) at 12:00

## 2017-04-11 RX ADMIN — IPRATROPIUM BROMIDE AND ALBUTEROL SULFATE SCH AMP: .5; 3 SOLUTION RESPIRATORY (INHALATION) at 06:31

## 2017-04-11 RX ADMIN — RIVAROXABAN SCH MG: 20 TABLET, FILM COATED ORAL at 20:04

## 2017-04-11 RX ADMIN — CITALOPRAM HYDROBROMIDE SCH MG: 10 TABLET ORAL at 10:01

## 2017-04-11 RX ADMIN — MONTELUKAST SODIUM SCH MG: 10 TABLET, COATED ORAL at 21:37

## 2017-04-11 NOTE — PN
Progress Note, Physician


History of Present Illness: 


PULLMONARY





ALERT,STILL DYSPNEIC,CONGESTED





- Current Medication List


Current Medications: 


Active Medications





Albuterol Sulfate (Ventolin 0.083% Nebulizer Soln -)  1 amp NEB Q4H PRN


   PRN Reason: SHORT OF BREATH/WHEEZING


Albuterol/Ipratropium (Duoneb -)  1 amp NEB QIDR [facility]


   Last Admin: 04/11/17 12:00 Dose:  1 amp


Budesonide/Formoterol Fumarate (Symbicort 160/4.5mcg -)  1 puff IH DAILY [facility]


   Last Admin: 04/10/17 23:23 Dose:  Not Given


Citalopram Hydrobromide (Celexa -)  10 mg PO DAILY [facility]


   Last Admin: 04/11/17 10:01 Dose:  10 mg


Clonazepam (Klonopin -)  0.5 mg PO DAILY [facility]


   Last Admin: 04/11/17 10:01 Dose:  0.5 mg


Methylprednisolone Sodium Succinate (Solu-Medrol -)  60 mg IVPB Q8H-IV [facility]


   Last Admin: 04/11/17 10:01 Dose:  60 mg


Montelukast Sodium (Singulair -)  10 mg PO HS [facility]


   Last Admin: 04/10/17 22:29 Dose:  10 mg


Rivaroxaban (Xarelto -)  20 mg PO DAILY [facility]


Spironolactone (Aldactone -)  25 mg PO DAILY [facility]


   Last Admin: 04/11/17 10:00 Dose:  25 mg


Verapamil HCl (Calan Sr -)  240 mg PO BID [facility]


   Last Admin: 04/11/17 11:57 Dose:  240 mg











- Objective


Vital Signs: 


 Vital Signs











Temperature  98.5 F   04/11/17 10:00


 


Pulse Rate  89   04/11/17 10:00


 


Respiratory Rate  20   04/11/17 10:00


 


Blood Pressure  111/74   04/11/17 10:00


 


O2 Sat by Pulse Oximetry (%)  92 L  04/11/17 09:00











Constitutional: Yes: Well Nourished, Calm


Eyes: Yes: WNL


HENT: Yes: WNL, Tonsillar Exudate


Cardiovascular: Yes: Pulse Irregular, S1, S2


Respiratory: Yes: Wheezes (BILATERAL WHEEZES)


Gastrointestinal: Yes: Normal Bowel Sounds, Soft


Extremities: Yes: WNL


Edema: No


Labs: 


 CBC, BMP





 04/11/17 05:35 





 04/11/17 06:00 











Assessment/Plan


Problem List





- Problems


(1) Acute asthma exacerbation


Code(s): J45.901 - UNSPECIFIED ASTHMA WITH (ACUTE) EXACERBATION   





(2) Atrial fibrillation


Code(s): I48.91 - UNSPECIFIED ATRIAL FIBRILLATION   





(3) CAD (coronary artery disease)


Code(s): I25.10 - ATHSCL HEART DISEASE OF NATIVE CORONARY ARTERY W/O ANG PCTRS 

  





(4) HLD (hyperlipidemia)


Code(s): E78.5 - HYPERLIPIDEMIA, UNSPECIFIED   





(5) HTN (hypertension)


Code(s): I10 - ESSENTIAL (PRIMARY) HYPERTENSION   








Assessment/Plan


Acute Asthma Exacerbation


Atrial Fibrillation


CAD


LV Diastolic Dysfunction


HTN


Hyperlipidemia





-  IV medrol same dose


-  inhaled bronchodilators standing and PRN


-  monitor daily peak flow


-  O2 as needed to keep SpO2 >90%


-  rate control


-  anticoagulation











DR ALONZO

## 2017-04-11 NOTE — PN
Progress Note, Physician





- Current Medication List


Current Medications: 


Active Medications





Albuterol Sulfate (Ventolin 0.083% Nebulizer Soln -)  1 amp NEB Q4H PRN


   PRN Reason: SHORT OF BREATH/WHEEZING


Albuterol/Ipratropium (Duoneb -)  1 amp NEB QIDR [facility]


   Last Admin: 04/11/17 18:09 Dose:  1 amp


Budesonide/Formoterol Fumarate (Symbicort 160/4.5mcg -)  1 puff IH DAILY [facility]


   Last Admin: 04/11/17 14:00 Dose:  1 puff


Citalopram Hydrobromide (Celexa -)  10 mg PO DAILY [facility]


   Last Admin: 04/11/17 10:01 Dose:  10 mg


Clonazepam (Klonopin -)  0.5 mg PO DAILY [facility]


   Last Admin: 04/11/17 10:01 Dose:  0.5 mg


Methylprednisolone Sodium Succinate (Solu-Medrol -)  60 mg IVPB Q8H-IV [facility]


   Last Admin: 04/11/17 17:38 Dose:  60 mg


Montelukast Sodium (Singulair -)  10 mg PO HS [facility]


   Last Admin: 04/10/17 22:29 Dose:  10 mg


Rivaroxaban (Xarelto -)  20 mg PO DAILY [facility]


Spironolactone (Aldactone -)  25 mg PO DAILY [facility]


   Last Admin: 04/11/17 10:00 Dose:  25 mg


Verapamil HCl (Calan Sr -)  240 mg PO BID [facility]


   Last Admin: 04/11/17 11:57 Dose:  240 mg











- Objective


Vital Signs: 


 Vital Signs











Temperature  98.2 F   04/11/17 16:35


 


Pulse Rate  82   04/11/17 16:35


 


Respiratory Rate  20   04/11/17 16:35


 


Blood Pressure  116/63   04/11/17 16:35


 


O2 Sat by Pulse Oximetry (%)  92 L  04/11/17 09:00











Constitutional: Yes: No Distress


HENT: Yes: Atraumatic


Neck: Yes: Supple


Cardiovascular: Yes: Regular Rate and Rhythm


Respiratory: Yes: Rhonchi, Wheezes


Gastrointestinal: Yes: Normal Bowel Sounds


Extremities: Yes: WNL


Neurological: Yes: Alert, Oriented


Labs: 


 CBC, BMP





 04/11/17 05:35 





 04/11/17 06:00 











Problem List





- Problems


(1) Acute asthma exacerbation


Assessment/Plan: 


duo nebs


iv steroids


Code(s): RADHA45.901 - UNSPECIFIED ASTHMA WITH (ACUTE) EXACERBATION   





(2) CAD (coronary artery disease)


Assessment/Plan: 


stable


on meds


Code(s): I25.10 - ATHSCL HEART DISEASE OF NATIVE CORONARY ARTERY W/O ANG PCTRS 

  





(3) Chest pain


Assessment/Plan: 


resolved


atypical


Code(s): R07.9 - CHEST PAIN, UNSPECIFIED   





(4) CHF exacerbation


Code(s): I50.9 - HEART FAILURE, UNSPECIFIED   Qualifiers: 


     Congestive heart failure type: unspecified congestive heart failure type  

   Qualified Code(s): I50.9 - Heart failure, unspecified  





(5) HLD (hyperlipidemia)


Assessment/Plan: 


on meds stable


Code(s): E78.5 - HYPERLIPIDEMIA, UNSPECIFIED   





(6) HTN (hypertension)


Assessment/Plan: 


on meds stable


Code(s): I10 - ESSENTIAL (PRIMARY) HYPERTENSION

## 2017-04-11 NOTE — PN
Progress Note, Physician


History of Present Illness: 


seen and examined today in [facility]. no overnight events. no new complaints. states 

her sob is slightly improved. no palpitations. slight lightheadedness when 

walking.








- Current Medication List


Current Medications: 


Active Medications





Albuterol Sulfate (Ventolin 0.083% Nebulizer Soln -)  1 amp NEB Q4H PRN


   PRN Reason: SHORT OF BREATH/WHEEZING


Albuterol/Ipratropium (Duoneb -)  1 amp NEB QIDR [facility]


   Last Admin: 04/11/17 06:31 Dose:  1 amp


Budesonide/Formoterol Fumarate (Symbicort 160/4.5mcg -)  1 puff IH DAILY [facility]


   Last Admin: 04/10/17 23:23 Dose:  Not Given


Citalopram Hydrobromide (Celexa -)  10 mg PO DAILY [facility]


   Last Admin: 04/11/17 10:01 Dose:  10 mg


Clonazepam (Klonopin -)  0.5 mg PO DAILY [facility]


   Last Admin: 04/11/17 10:01 Dose:  0.5 mg


Methylprednisolone Sodium Succinate (Solu-Medrol -)  60 mg IVPB Q8H-IV [facility]


   Last Admin: 04/11/17 10:01 Dose:  60 mg


Montelukast Sodium (Singulair -)  10 mg PO HS [facility]


   Last Admin: 04/10/17 22:29 Dose:  10 mg


Non-Formulary Medication (Rivaroxaban [Xarelto])  1 each PO DAILY [facility]


Spironolactone (Aldactone -)  25 mg PO DAILY [facility]


   Last Admin: 04/11/17 10:00 Dose:  25 mg


Verapamil HCl (Calan Sr -)  240 mg PO BID [facility]











- Objective


Vital Signs: 


 Vital Signs











Temperature  97.8 F   04/11/17 06:10


 


Pulse Rate  88   04/11/17 06:10


 


Respiratory Rate  20   04/11/17 06:10


 


Blood Pressure  127/72   04/11/17 06:10


 


O2 Sat by Pulse Oximetry (%)  96   04/11/17 01:53











Constitutional: Yes: No Distress, Calm, Obese


Eyes: Yes: WNL, Conjunctiva Clear, EOM Intact, PERRL


HENT: Yes: WNL, Atraumatic, Normocephalic


Neck: Yes: WNL, Supple, Trachea Midline


Cardiovascular: Yes: Pulse Irregular, S1, S2.  No: Bradycardia, Tachycardia, 

Bruit, JVD, Gallop, Murmur, Rub, S3, S4, Varicosities


Respiratory: Yes: Regular, Rhonchi, Wheezes.  No: Rales


Gastrointestinal: Yes: WNL, Normal Bowel Sounds, Soft.  No: Distention, 

Tenderness


Musculoskeletal: Yes: WNL


Extremities: Yes: WNL


Edema: No


Peripheral Pulses WNL: Yes


Peripheral Pulses: Left Doralis Pedis: 2+, Right Dorsalis Pedis: 2+


Integumentary: Yes: WNL


Neurological: Yes: WNL, Alert, Oriented, Cran Nerves II-XII Intact


...Motor Strength: WNL


Psychiatric: Yes: WNL, Alert, Oriented


Labs: 


 CBC, BMP





 04/11/17 05:35 





 04/11/17 06:00 











- ....Imaging


Chest X-ray: Report Reviewed, Image Reviewed


EKG: Report Reviewed, Image Reviewed


Other: Report Reviewed, Image Reviewed (tele-Afib currently HR controlled, 

periods of RVR especially with minimal exertion)





Assessment/Plan


61 year old woman with a history of chronic asthma, chronic diastolic CHF, 

paroxysmal atrial fibrillation, HTN, HLD, non-obstructive CAD on cardiac cath 1/ 2014, h/o of an episode of possible myocarditis, admitted with sob.





SOB-wheezing and rhonchi on exam c/w exacerbation of asthma


-no sign of decompensated CHF


-hold off on diuresis for now, tx for asthma exacerbation


-echo not done yesterday, can be done as outpatient





Atrial fibrillation-paroxysmal with periods of RVR especially with exertion in 

the hospital room


-uptitrate Verapamil ER to 240mg bid


-if HR is not adequately controlled will plan to add Digoxin at a reduced dose 

as Verapamil can increase the digoxin levels


-did not tolerate bblockers or cardizem in the past


-if needed can use IV cardizem prn for HR control


-cont Xarelto-started recently by a different cardiologist (Dr. Farfan) at [facility]





CAD-non-obstructive as above, unlikely active issue


-no chest pain


-ekg abnormal but the abnormalities are chronic, unchanged from prior ekgs


-cont home ASA 81mg daily


-did not tolerate bblocker in the past





HTN-adequately controlled


-cont current medical regimen

## 2017-04-12 RX ADMIN — BUDESONIDE AND FORMOTEROL FUMARATE DIHYDRATE SCH PUFF: 160; 4.5 AEROSOL RESPIRATORY (INHALATION) at 09:46

## 2017-04-12 RX ADMIN — VERAPAMIL HYDROCHLORIDE SCH MG: 240 TABLET, FILM COATED, EXTENDED RELEASE ORAL at 21:54

## 2017-04-12 RX ADMIN — METHYLPREDNISOLONE SODIUM SUCCINATE SCH MG: 125 INJECTION, POWDER, FOR SOLUTION INTRAMUSCULAR; INTRAVENOUS at 18:22

## 2017-04-12 RX ADMIN — SPIRONOLACTONE SCH MG: 25 TABLET, FILM COATED ORAL at 09:42

## 2017-04-12 RX ADMIN — IPRATROPIUM BROMIDE AND ALBUTEROL SULFATE SCH AMP: .5; 3 SOLUTION RESPIRATORY (INHALATION) at 23:52

## 2017-04-12 RX ADMIN — CITALOPRAM HYDROBROMIDE SCH MG: 10 TABLET ORAL at 09:42

## 2017-04-12 RX ADMIN — RIVAROXABAN SCH MG: 20 TABLET, FILM COATED ORAL at 09:45

## 2017-04-12 RX ADMIN — MONTELUKAST SODIUM SCH MG: 10 TABLET, COATED ORAL at 21:54

## 2017-04-12 RX ADMIN — METHYLPREDNISOLONE SODIUM SUCCINATE SCH MG: 125 INJECTION, POWDER, FOR SOLUTION INTRAMUSCULAR; INTRAVENOUS at 09:44

## 2017-04-12 RX ADMIN — VERAPAMIL HYDROCHLORIDE SCH MG: 240 TABLET, FILM COATED, EXTENDED RELEASE ORAL at 09:45

## 2017-04-12 RX ADMIN — IPRATROPIUM BROMIDE AND ALBUTEROL SULFATE SCH AMP: .5; 3 SOLUTION RESPIRATORY (INHALATION) at 19:04

## 2017-04-12 RX ADMIN — IPRATROPIUM BROMIDE AND ALBUTEROL SULFATE SCH AMP: .5; 3 SOLUTION RESPIRATORY (INHALATION) at 06:49

## 2017-04-12 RX ADMIN — METHYLPREDNISOLONE SODIUM SUCCINATE SCH MG: 125 INJECTION, POWDER, FOR SOLUTION INTRAMUSCULAR; INTRAVENOUS at 01:28

## 2017-04-12 RX ADMIN — IPRATROPIUM BROMIDE AND ALBUTEROL SULFATE SCH AMP: .5; 3 SOLUTION RESPIRATORY (INHALATION) at 11:53

## 2017-04-12 RX ADMIN — GUAIFENESIN AND DEXTROMETHORPHAN HYDROBROMIDE PRN ML: 100; 10 SOLUTION ORAL at 20:10

## 2017-04-12 NOTE — PN
Progress Note, Physician


History of Present Illness: 


PULMONARY





ALERT,STILL C/O COUGH,LESS DYSPNEIC





- Current Medication List


Current Medications: 


Active Medications





Albuterol Sulfate (Ventolin 0.083% Nebulizer Soln -)  1 amp NEB Q4H PRN


   PRN Reason: SHORT OF BREATH/WHEEZING


Albuterol/Ipratropium (Duoneb -)  1 amp NEB QIDR [facility]


   Last Admin: 04/12/17 11:53 Dose:  1 amp


Budesonide/Formoterol Fumarate (Symbicort 160/4.5mcg -)  1 puff IH DAILY [facility]


   Last Admin: 04/12/17 09:46 Dose:  1 puff


Citalopram Hydrobromide (Celexa -)  10 mg PO DAILY [facility]


   Last Admin: 04/12/17 09:42 Dose:  10 mg


Clonazepam (Klonopin -)  0.5 mg PO DAILY [facility]


   Last Admin: 04/12/17 09:43 Dose:  Not Given


Methylprednisolone Sodium Succinate (Solu-Medrol -)  60 mg IVPB Q8H-IV [facility]


   Last Admin: 04/12/17 09:44 Dose:  60 mg


Montelukast Sodium (Singulair -)  10 mg PO HS [facility]


   Last Admin: 04/11/17 21:37 Dose:  10 mg


Rivaroxaban (Xarelto -)  20 mg PO DAILY [facility]


   Last Admin: 04/12/17 09:45 Dose:  20 mg


Spironolactone (Aldactone -)  25 mg PO DAILY [facility]


   Last Admin: 04/12/17 09:42 Dose:  25 mg


Verapamil HCl (Calan Sr -)  240 mg PO BID [facility]


   Last Admin: 04/12/17 09:45 Dose:  240 mg











- Objective


Vital Signs: 


 Vital Signs











Temperature  97.8 F   04/12/17 14:01


 


Pulse Rate  83   04/12/17 14:01


 


Respiratory Rate  18   04/12/17 14:01


 


Blood Pressure  112/65   04/12/17 14:01


 


O2 Sat by Pulse Oximetry (%)  100   04/12/17 08:47











Constitutional: Yes: Well Nourished, Calm


Eyes: Yes: WNL


HENT: Yes: WNL


Neck: Yes: WNL


Cardiovascular: Yes: Pulse Irregular, S1, S2


Respiratory: Yes: Rhonchi (ROCKY WHEEZES AND RHONCHI), Wheezes


Gastrointestinal: Yes: WNL


Extremities: Yes: WNL


Edema: No


Labs: 


 





Assessment/Plan


Problem List





- Problems


(1) Acute asthma exacerbation


Code(s): J45.901 - UNSPECIFIED ASTHMA WITH (ACUTE) EXACERBATION   





(2) Atrial fibrillation


Code(s): I48.91 - UNSPECIFIED ATRIAL FIBRILLATION   





(3) CAD (coronary artery disease)


Code(s): I25.10 - ATHSCL HEART DISEASE OF NATIVE CORONARY ARTERY W/O ANG PCTRS 

  





(4) HLD (hyperlipidemia)


Code(s): E78.5 - HYPERLIPIDEMIA, UNSPECIFIED   





(5) HTN (hypertension)


Code(s): I10 - ESSENTIAL (PRIMARY) HYPERTENSION   








Assessment/Plan


Acute Asthma Exacerbation


Atrial Fibrillation


CAD


LV Diastolic Dysfunction


HTN


Hyperlipidemia





-  IV medrol same dose


-  inhaled bronchodilators standing and PRN


-  monitor daily peak flow


-  O2 as needed to keep SpO2 >90%


-  rate control


-  anticoagulation











DR LAONZO

## 2017-04-12 NOTE — PN
Progress Note, Physician


History of Present Illness: 


seen and examined today in [facility]. again states she feels slightly better since 

yesterday. as per RN notes pt was up and walking without tachycardia. no 

palpitations. no overnight events no new complaints.








- Current Medication List


Current Medications: 


Active Medications





Albuterol Sulfate (Ventolin 0.083% Nebulizer Soln -)  1 amp NEB Q4H PRN


   PRN Reason: SHORT OF BREATH/WHEEZING


Albuterol/Ipratropium (Duoneb -)  1 amp NEB QIDR [facility]


   Last Admin: 04/12/17 11:53 Dose:  1 amp


Budesonide/Formoterol Fumarate (Symbicort 160/4.5mcg -)  1 puff IH DAILY [facility]


   Last Admin: 04/12/17 09:46 Dose:  1 puff


Citalopram Hydrobromide (Celexa -)  10 mg PO DAILY [facility]


   Last Admin: 04/12/17 09:42 Dose:  10 mg


Clonazepam (Klonopin -)  0.5 mg PO DAILY [facility]


   Last Admin: 04/12/17 09:43 Dose:  Not Given


Methylprednisolone Sodium Succinate (Solu-Medrol -)  60 mg IVPB Q8H-IV [facility]


   Last Admin: 04/12/17 09:44 Dose:  60 mg


Montelukast Sodium (Singulair -)  10 mg PO HS [facility]


   Last Admin: 04/11/17 21:37 Dose:  10 mg


Rivaroxaban (Xarelto -)  20 mg PO DAILY [facility]


   Last Admin: 04/12/17 09:45 Dose:  20 mg


Spironolactone (Aldactone -)  25 mg PO DAILY [facility]


   Last Admin: 04/12/17 09:42 Dose:  25 mg


Verapamil HCl (Calan Sr -)  240 mg PO BID [facility]


   Last Admin: 04/12/17 09:45 Dose:  240 mg











- Objective


Vital Signs: 


 Vital Signs











Temperature  97.8 F   04/12/17 14:01


 


Pulse Rate  83   04/12/17 14:01


 


Respiratory Rate  18   04/12/17 14:01


 


Blood Pressure  112/65   04/12/17 14:01


 


O2 Sat by Pulse Oximetry (%)  100   04/12/17 08:47











Constitutional: Yes: No Distress, Calm, Obese


Eyes: Yes: Conjunctiva Clear, EOM Intact, PERRL


HENT: Yes: Atraumatic, Normocephalic


Neck: Yes: Supple, Trachea Midline


Cardiovascular: Yes: Pulse Irregular, S1, S2.  No: Bradycardia, Tachycardia, 

Bruit, JVD, Gallop, Murmur, Rub, S3, S4, Varicosities


Respiratory: Yes: Regular, Wheezes.  No: Rales, Rhonchi, SOB


Gastrointestinal: Yes: WNL, Normal Bowel Sounds, Soft.  No: Distention, 

Tenderness


Musculoskeletal: Yes: WNL


Extremities: Yes: WNL


Edema: No


Peripheral Pulses WNL: Yes


Peripheral Pulses: Left Doralis Pedis: 2+, Right Dorsalis Pedis: 2+


Integumentary: Yes: WNL


Neurological: Yes: WNL, Alert, Oriented, Cran Nerves II-XII Intact


...Motor Strength: WNL


Psychiatric: Yes: WNL, Alert, Oriented


Labs: 


 CBC, BMP





 04/11/17 05:35 





 04/11/17 06:00 











- ....Imaging


Chest X-ray: Report Reviewed, Image Reviewed


EKG: Report Reviewed, Image Reviewed


Other: Report Reviewed, Image Reviewed (tele-Afib, still intermittent episodes 

of RVR but better controlled)





Assessment/Plan


61 year old woman with a history of chronic asthma, chronic diastolic CHF, 

paroxysmal atrial fibrillation, HTN, HLD, non-obstructive CAD on cardiac cath 1/ 2014, h/o of an episode of possible myocarditis, admitted with sob.





SOB-wheezing and rhonchi on exam c/w exacerbation of asthma


-improving clinically


-still no sign of decompensated CHF


-hold off on diuresis for now, tx for asthma exacerbation


-echo can be done as outpatient





Atrial fibrillation-paroxysmal with periods of RVR especially with exertion in 

the hospital room


-HR likely exacerbated by SOB/Asthma exacerbation


-uptitrated Verapamil ER to 240mg bid


-HR is better controlled including when up and walking


-will cont with Verapamil alone for now and re-evaluate after asthma improves


-if HR is not adequately controlled will plan to add Digoxin at a reduced dose 

as Verapamil can increase the digoxin levels


-did not tolerate bblockers or cardizem in the past


-if needed can use IV cardizem prn for HR control


-cont Xarelto


-cont tele throughout admission





CAD-non-obstructive as above, stable


-no chest pain


-ekg abnormal but the abnormalities are chronic, unchanged from prior ekgs


-cont home ASA 81mg daily


-did not tolerate bblocker in the past





HTN-adequately controlled, low normal


-tolerating uptitrated dose of verapamil


-cont current medical regimen

## 2017-04-13 RX ADMIN — IPRATROPIUM BROMIDE AND ALBUTEROL SULFATE SCH AMP: .5; 3 SOLUTION RESPIRATORY (INHALATION) at 17:20

## 2017-04-13 RX ADMIN — GUAIFENESIN AND DEXTROMETHORPHAN HYDROBROMIDE PRN ML: 100; 10 SOLUTION ORAL at 05:58

## 2017-04-13 RX ADMIN — MONTELUKAST SODIUM SCH MG: 10 TABLET, COATED ORAL at 21:46

## 2017-04-13 RX ADMIN — VERAPAMIL HYDROCHLORIDE SCH MG: 240 TABLET, FILM COATED, EXTENDED RELEASE ORAL at 09:27

## 2017-04-13 RX ADMIN — SPIRONOLACTONE SCH MG: 25 TABLET, FILM COATED ORAL at 09:27

## 2017-04-13 RX ADMIN — METHYLPREDNISOLONE SODIUM SUCCINATE SCH MG: 125 INJECTION, POWDER, FOR SOLUTION INTRAMUSCULAR; INTRAVENOUS at 02:33

## 2017-04-13 RX ADMIN — GUAIFENESIN AND DEXTROMETHORPHAN HYDROBROMIDE PRN ML: 100; 10 SOLUTION ORAL at 02:34

## 2017-04-13 RX ADMIN — RIVAROXABAN SCH MG: 20 TABLET, FILM COATED ORAL at 09:27

## 2017-04-13 RX ADMIN — CITALOPRAM HYDROBROMIDE SCH MG: 10 TABLET ORAL at 09:27

## 2017-04-13 RX ADMIN — IPRATROPIUM BROMIDE AND ALBUTEROL SULFATE SCH AMP: .5; 3 SOLUTION RESPIRATORY (INHALATION) at 06:45

## 2017-04-13 RX ADMIN — BUDESONIDE AND FORMOTEROL FUMARATE DIHYDRATE SCH PUFF: 160; 4.5 AEROSOL RESPIRATORY (INHALATION) at 09:26

## 2017-04-13 RX ADMIN — VERAPAMIL HYDROCHLORIDE SCH MG: 240 TABLET, FILM COATED, EXTENDED RELEASE ORAL at 21:41

## 2017-04-13 RX ADMIN — METHYLPREDNISOLONE SODIUM SUCCINATE SCH MG: 125 INJECTION, POWDER, FOR SOLUTION INTRAMUSCULAR; INTRAVENOUS at 17:54

## 2017-04-13 RX ADMIN — GUAIFENESIN AND DEXTROMETHORPHAN HYDROBROMIDE PRN ML: 100; 10 SOLUTION ORAL at 18:51

## 2017-04-13 RX ADMIN — IPRATROPIUM BROMIDE AND ALBUTEROL SULFATE SCH AMP: .5; 3 SOLUTION RESPIRATORY (INHALATION) at 12:13

## 2017-04-13 RX ADMIN — GUAIFENESIN AND DEXTROMETHORPHAN HYDROBROMIDE PRN ML: 100; 10 SOLUTION ORAL at 09:31

## 2017-04-13 RX ADMIN — METHYLPREDNISOLONE SODIUM SUCCINATE SCH MG: 125 INJECTION, POWDER, FOR SOLUTION INTRAMUSCULAR; INTRAVENOUS at 09:27

## 2017-04-13 RX ADMIN — IPRATROPIUM BROMIDE AND ALBUTEROL SULFATE SCH AMP: .5; 3 SOLUTION RESPIRATORY (INHALATION) at 23:33

## 2017-04-13 NOTE — PN
Progress Note, Physician


History of Present Illness: 


coughing





- Current Medication List


Current Medications: 


Active Medications





Albuterol Sulfate (Ventolin 0.083% Nebulizer Soln -)  1 amp NEB Q4H PRN


   PRN Reason: SHORT OF BREATH/WHEEZING


Albuterol/Ipratropium (Duoneb -)  1 amp NEB QIDR [facility]


   Last Admin: 04/13/17 17:20 Dose:  1 amp


Budesonide/Formoterol Fumarate (Symbicort 160/4.5mcg -)  1 puff IH DAILY [facility]


   Last Admin: 04/13/17 09:26 Dose:  1 puff


Citalopram Hydrobromide (Celexa -)  10 mg PO DAILY [facility]


   Last Admin: 04/13/17 09:27 Dose:  10 mg


Clonazepam (Klonopin -)  0.5 mg PO HS [facility]


   Last Admin: 04/12/17 21:54 Dose:  0.5 mg


Guaifenesin (Diabetic Tussin Dm -)  10 ml PO Q4H PRN


   PRN Reason: COUGH


   Last Admin: 04/13/17 09:31 Dose:  10 ml


Montelukast Sodium (Singulair -)  10 mg PO HS [facility]


   Last Admin: 04/12/17 21:54 Dose:  10 mg


Rivaroxaban (Xarelto -)  20 mg PO DAILY [facility]


   Last Admin: 04/13/17 09:27 Dose:  20 mg


Spironolactone (Aldactone -)  25 mg PO DAILY [facility]


   Last Admin: 04/13/17 09:27 Dose:  25 mg


Verapamil HCl (Calan Sr -)  240 mg PO BID [facility]


   Last Admin: 04/13/17 09:27 Dose:  240 mg











- Objective


Vital Signs: 


 Vital Signs











Temperature  97.9 F   04/13/17 17:25


 


Pulse Rate  88   04/13/17 17:25


 


Respiratory Rate  20   04/13/17 17:25


 


Blood Pressure  104/57   04/13/17 17:25


 


O2 Sat by Pulse Oximetry (%)  99   04/13/17 14:05











Constitutional: Yes: No Distress


HENT: Yes: Atraumatic


Neck: Yes: Supple


Cardiovascular: Yes: Regular Rate and Rhythm


Respiratory: Yes: Rhonchi, Wheezes


Gastrointestinal: Yes: Normal Bowel Sounds


Extremities: Yes: WNL


Neurological: Yes: Alert, Oriented


Labs: 


 CBC, BMP





 04/11/17 05:35 





 04/11/17 06:00 











Problem List





- Problems


(1) Acute asthma exacerbation


Assessment/Plan: 


duo nebs


iv steroids


Code(s): J45.901 - UNSPECIFIED ASTHMA WITH (ACUTE) EXACERBATION   





(2) CAD (coronary artery disease)


Assessment/Plan: 


stable


on meds


Code(s): I25.10 - ATHSCL HEART DISEASE OF NATIVE CORONARY ARTERY W/O ANG PCTRS 

  





(3) Chest pain


Assessment/Plan: 


resolved


atypical


Code(s): R07.9 - CHEST PAIN, UNSPECIFIED   





(4) CHF exacerbation


Code(s): I50.9 - HEART FAILURE, UNSPECIFIED   Qualifiers: 


     Congestive heart failure type: unspecified congestive heart failure type  

   Qualified Code(s): I50.9 - Heart failure, unspecified  





(5) HLD (hyperlipidemia)


Assessment/Plan: 


on meds stable


Code(s): E78.5 - HYPERLIPIDEMIA, UNSPECIFIED   





(6) HTN (hypertension)


Assessment/Plan: 


on meds stable


Code(s): I10 - ESSENTIAL (PRIMARY) HYPERTENSION

## 2017-04-13 NOTE — PN
Progress Note, Physician


History of Present Illness: 


seen and examined today in [facility]. states she is feeling much better today, almost 

back to baseline. no overnight events. no new complaints.








- Current Medication List


Current Medications: 


Active Medications





Albuterol Sulfate (Ventolin 0.083% Nebulizer Soln -)  1 amp NEB Q4H PRN


   PRN Reason: SHORT OF BREATH/WHEEZING


Albuterol/Ipratropium (Duoneb -)  1 amp NEB QIDR [facility]


   Last Admin: 04/13/17 06:45 Dose:  1 amp


Budesonide/Formoterol Fumarate (Symbicort 160/4.5mcg -)  1 puff IH DAILY [facility]


   Last Admin: 04/13/17 09:26 Dose:  1 puff


Citalopram Hydrobromide (Celexa -)  10 mg PO DAILY [facility]


   Last Admin: 04/13/17 09:27 Dose:  10 mg


Clonazepam (Klonopin -)  0.5 mg PO HS [facility]


   Last Admin: 04/12/17 21:54 Dose:  0.5 mg


Guaifenesin (Diabetic Tussin Dm -)  10 ml PO Q4H PRN


   PRN Reason: COUGH


   Last Admin: 04/13/17 09:31 Dose:  10 ml


Methylprednisolone Sodium Succinate (Solu-Medrol -)  60 mg IVPB Q8H-IV [facility]


   Last Admin: 04/13/17 09:27 Dose:  60 mg


Montelukast Sodium (Singulair -)  10 mg PO HS [facility]


   Last Admin: 04/12/17 21:54 Dose:  10 mg


Rivaroxaban (Xarelto -)  20 mg PO DAILY [facility]


   Last Admin: 04/13/17 09:27 Dose:  20 mg


Spironolactone (Aldactone -)  25 mg PO DAILY [facility]


   Last Admin: 04/13/17 09:27 Dose:  25 mg


Verapamil HCl (Calan Sr -)  240 mg PO BID [facility]


   Last Admin: 04/13/17 09:27 Dose:  240 mg











- Objective


Vital Signs: 


 Vital Signs











Temperature  97.6 F   04/13/17 05:59


 


Pulse Rate  79   04/13/17 05:59


 


Respiratory Rate  18   04/13/17 05:59


 


Blood Pressure  126/70   04/13/17 05:59


 


O2 Sat by Pulse Oximetry (%)  95   04/12/17 22:00











Constitutional: Yes: No Distress, Calm, Obese


Eyes: Yes: WNL, Conjunctiva Clear, EOM Intact, PERRL


HENT: Yes: WNL, Atraumatic, Normocephalic


Neck: Yes: WNL, Supple, Trachea Midline


Cardiovascular: Yes: Pulse Irregular, Murmur, S1, S2.  No: Bradycardia, 

Tachycardia, Bruit, JVD, Gallop, Rub, S3, S4, Varicosities


Respiratory: Yes: Regular, Rhonchi, Wheezes.  No: Rales


Gastrointestinal: Yes: WNL, Normal Bowel Sounds, Soft.  No: Distention, 

Tenderness


Musculoskeletal: Yes: WNL


Extremities: Yes: WNL


Edema: No


Peripheral Pulses WNL: Yes


Peripheral Pulses: Left Doralis Pedis: 2+, Right Dorsalis Pedis: 2+


Integumentary: Yes: WNL


Neurological: Yes: WNL, Alert, Oriented, Cran Nerves II-XII Intact


...Motor Strength: WNL


Psychiatric: Yes: WNL


Labs: 


 CBC, BMP





 04/11/17 05:35 





 04/11/17 06:00 











- ....Imaging


Chest X-ray: Report Reviewed, Image Reviewed


EKG: Report Reviewed, Image Reviewed


Other: Report Reviewed, Image Reviewed (tele-AFib, HR controlled, brief 

episodes RVR yesterday afternoon)





Assessment/Plan


61 year old woman with a history of chronic asthma, chronic diastolic CHF, 

paroxysmal atrial fibrillation, HTN, HLD, non-obstructive CAD on cardiac cath 1/ 2014, h/o of an episode of possible myocarditis, admitted with sob.





SOB-asthma exacerbation


-significantly improved


-no sign of decompensated CHF


-did not require diuresis


-echo can be done as outpatient





Atrial fibrillation-paroxysmal, HR currently well controlled, brief episodes of 

RVR yesterday


-HR likely exacerbated by SOB/Asthma exacerbation and has improved with 

improvement in SOB


-cont Verapamil ER 240mg bid with plan to dc home on this dose


-pt should have close outpatient cardiology follow up, i discussed with her and 

her daughter this am that I would recc f/up within 1-2 weeks with a plan for an 

outpatient event monitor to further evaluate AFib control. I believe they will f

/up with Dr. Farfan at [facility], but I have offered f/up with me


-cont Tete


-ok from a cardiac standpoint for discharge home, if remains inpatient can dc 

tele





CAD-non-obstructive as above, stable


-no chest pain


-ekg abnormal but the abnormalities are chronic, unchanged from prior ekgs


-cont home ASA 81mg daily


-did not tolerate bblocker in the past





HTN-adequately controlled, low normal


-tolerating uptitrated dose of verapamil


-cont current medical regimen

## 2017-04-14 RX ADMIN — SPIRONOLACTONE SCH MG: 25 TABLET, FILM COATED ORAL at 10:31

## 2017-04-14 RX ADMIN — GUAIFENESIN AND DEXTROMETHORPHAN HYDROBROMIDE PRN ML: 100; 10 SOLUTION ORAL at 17:16

## 2017-04-14 RX ADMIN — IPRATROPIUM BROMIDE AND ALBUTEROL SULFATE SCH AMP: .5; 3 SOLUTION RESPIRATORY (INHALATION) at 12:10

## 2017-04-14 RX ADMIN — METHYLPREDNISOLONE SODIUM SUCCINATE SCH MG: 40 INJECTION, POWDER, FOR SOLUTION INTRAMUSCULAR; INTRAVENOUS at 10:33

## 2017-04-14 RX ADMIN — VERAPAMIL HYDROCHLORIDE SCH MG: 240 TABLET, FILM COATED, EXTENDED RELEASE ORAL at 22:09

## 2017-04-14 RX ADMIN — METHYLPREDNISOLONE SODIUM SUCCINATE SCH MG: 40 INJECTION, POWDER, FOR SOLUTION INTRAMUSCULAR; INTRAVENOUS at 17:16

## 2017-04-14 RX ADMIN — BUDESONIDE AND FORMOTEROL FUMARATE DIHYDRATE SCH PUFF: 160; 4.5 AEROSOL RESPIRATORY (INHALATION) at 10:34

## 2017-04-14 RX ADMIN — GUAIFENESIN AND DEXTROMETHORPHAN HYDROBROMIDE PRN ML: 100; 10 SOLUTION ORAL at 10:32

## 2017-04-14 RX ADMIN — MONTELUKAST SODIUM SCH MG: 10 TABLET, COATED ORAL at 22:09

## 2017-04-14 RX ADMIN — IPRATROPIUM BROMIDE AND ALBUTEROL SULFATE SCH AMP: .5; 3 SOLUTION RESPIRATORY (INHALATION) at 07:10

## 2017-04-14 RX ADMIN — IPRATROPIUM BROMIDE AND ALBUTEROL SULFATE SCH AMP: .5; 3 SOLUTION RESPIRATORY (INHALATION) at 18:41

## 2017-04-14 RX ADMIN — VERAPAMIL HYDROCHLORIDE SCH MG: 240 TABLET, FILM COATED, EXTENDED RELEASE ORAL at 10:31

## 2017-04-14 RX ADMIN — CITALOPRAM HYDROBROMIDE SCH MG: 10 TABLET ORAL at 10:31

## 2017-04-14 RX ADMIN — RIVAROXABAN SCH MG: 20 TABLET, FILM COATED ORAL at 10:32

## 2017-04-14 RX ADMIN — METHYLPREDNISOLONE SODIUM SUCCINATE SCH MG: 40 INJECTION, POWDER, FOR SOLUTION INTRAMUSCULAR; INTRAVENOUS at 01:41

## 2017-04-14 NOTE — PN
Progress Note (short form)





- Note


Progress Note: 


PULMONARY





VSS/AFEBRILE





ANICTERIC


DIFFUSE B/L WHEEZE 


S1S2


BS+ OBESE


NO EDEMA





LABS/MEDS/NOTES/IMAGING/MICRO REVIEWED





Acute Asthma Exacerbation


Atrial Fibrillation


CAD


LV Diastolic Dysfunction


HTN


Hyperlipidemia





-  IV medrol same dose


-  inhaled bronchodilators standing and PRN


-  monitor daily peak flow


-  O2 as needed to keep SpO2 >90%


-  rate control


-  anticoagulation





MARIA ALEJANDRA SWAN MD

## 2017-04-14 NOTE — PN
Progress Note, Physician


History of Present Illness: 


coughing





- Current Medication List


Current Medications: 


Active Medications





Albuterol Sulfate (Ventolin 0.083% Nebulizer Soln -)  1 amp NEB Q4H PRN


   PRN Reason: SHORT OF BREATH/WHEEZING


Albuterol/Ipratropium (Duoneb -)  1 amp NEB QIDR [facility]


   Last Admin: 04/14/17 18:41 Dose:  1 amp


Budesonide/Formoterol Fumarate (Symbicort 160/4.5mcg -)  1 puff IH DAILY [facility]


   Last Admin: 04/14/17 10:34 Dose:  1 puff


Citalopram Hydrobromide (Celexa -)  10 mg PO DAILY [facility]


   Last Admin: 04/14/17 10:31 Dose:  10 mg


Clonazepam (Klonopin -)  0.5 mg PO HS [facility]


   Last Admin: 04/13/17 21:41 Dose:  0.5 mg


Guaifenesin (Diabetic Tussin Dm -)  10 ml PO Q4H PRN


   PRN Reason: COUGH


   Last Admin: 04/14/17 17:16 Dose:  10 ml


Methylprednisolone Sodium Succinate (Solu-Medrol -)  40 mg IVPB Q8H-IV [facility]


   Last Admin: 04/14/17 17:16 Dose:  40 mg


Montelukast Sodium (Singulair -)  10 mg PO HS [facility]


   Last Admin: 04/13/17 21:46 Dose:  10 mg


Rivaroxaban (Xarelto -)  20 mg PO DAILY [facility]


   Last Admin: 04/14/17 10:32 Dose:  20 mg


Spironolactone (Aldactone -)  25 mg PO DAILY [facility]


   Last Admin: 04/14/17 10:31 Dose:  25 mg


Verapamil HCl (Calan Sr -)  240 mg PO BID [facility]


   Last Admin: 04/14/17 10:31 Dose:  240 mg











- Objective


Vital Signs: 


 Vital Signs











Temperature  98.2 F   04/14/17 18:00


 


Pulse Rate  102 H  04/14/17 18:00


 


Respiratory Rate  16   04/14/17 18:00


 


Blood Pressure  119/76   04/14/17 18:00


 


O2 Sat by Pulse Oximetry (%)  96   04/13/17 21:00











Constitutional: Yes: No Distress


HENT: Yes: Atraumatic


Neck: Yes: Supple


Cardiovascular: Yes: Regular Rate and Rhythm


Respiratory: Yes: Wheezes


Gastrointestinal: Yes: Normal Bowel Sounds


Extremities: Yes: WNL


Neurological: Yes: Alert, Oriented


Labs: 


 CBC, BMP





 04/11/17 05:35 





 04/11/17 06:00 











Problem List





- Problems


(1) Acute asthma exacerbation


Assessment/Plan: 


duo nebs


iv steroids


Code(s): J45.901 - UNSPECIFIED ASTHMA WITH (ACUTE) EXACERBATION   





(2) CAD (coronary artery disease)


Assessment/Plan: 


stable


on meds


Code(s): I25.10 - ATHSCL HEART DISEASE OF NATIVE CORONARY ARTERY W/O ANG PCTRS 

  





(3) Chest pain


Assessment/Plan: 


resolved


atypical


Code(s): R07.9 - CHEST PAIN, UNSPECIFIED   





(4) CHF exacerbation


Assessment/Plan: 


diuretics


stable


Code(s): I50.9 - HEART FAILURE, UNSPECIFIED   Qualifiers: 


     Congestive heart failure type: unspecified congestive heart failure type  

   Qualified Code(s): I50.9 - Heart failure, unspecified  





(5) HLD (hyperlipidemia)


Assessment/Plan: 


on meds stable


Code(s): E78.5 - HYPERLIPIDEMIA, UNSPECIFIED   





(6) HTN (hypertension)


Assessment/Plan: 


on meds stable


Code(s): I10 - ESSENTIAL (PRIMARY) HYPERTENSION   








Assessment/Plan














Assessment/Plan


61 year old woman with a history of chronic asthma, chronic diastolic CHF, 

paroxysmal atrial fibrillation, HTN, HLD, non-obstructive CAD on cardiac cath 1/ 2014, h/o of an episode of possible myocarditis, admitted with sob.





Acute Asthma Exacerbation


Atrial Fibrillation


CAD


LV Diastolic Dysfunction


HTN


Hyperlipidemia


CHF





PLAN


continue duo nebs


iv steroids


home meds


diuretics











-

## 2017-04-15 LAB
ALBUMIN SERPL-MCNC: 3.2 G/DL (ref 3.4–5)
ALP SERPL-CCNC: 72 U/L (ref 45–117)
ALT SERPL-CCNC: 33 U/L (ref 12–78)
ANION GAP SERPL CALC-SCNC: 10 MMOL/L (ref 8–16)
AST SERPL-CCNC: 12 U/L (ref 15–37)
BILIRUB SERPL-MCNC: 0.7 MG/DL (ref 0.2–1)
CALCIUM SERPL-MCNC: 8.7 MG/DL (ref 8.5–10.1)
CO2 SERPL-SCNC: 30 MMOL/L (ref 21–32)
COCKROFT - GAULT: 109.56
CREAT SERPL-MCNC: 1 MG/DL (ref 0.55–1.02)
DEPRECATED RDW RBC AUTO: 16.1 % (ref 11.6–15.6)
GLUCOSE SERPL-MCNC: 141 MG/DL (ref 74–106)
HYPOCHROMIA BLD QL SMEAR: (no result)
MCH RBC QN AUTO: 27.1 PG (ref 25.7–33.7)
MCHC RBC AUTO-ENTMCNC: 32.5 G/DL (ref 32–36)
MCV RBC: 83.4 FL (ref 80–96)
NEUTROPHILS # BLD: 88 % (ref 42.8–82.8)
PLATELET # BLD AUTO: 268 K/MM3 (ref 134–434)
PLATELET # BLD EST: ADEQUATE 10*3/UL
PLATELET COMMENT2: (no result)
PMV BLD: 7.5 FL (ref 7.5–11.1)
POIKILOCYTOSIS BLD QL SMEAR: (no result)
PROT SERPL-MCNC: 6.1 G/DL (ref 6.4–8.2)
WBC # BLD AUTO: 14 K/MM3 (ref 4–10)

## 2017-04-15 RX ADMIN — SPIRONOLACTONE SCH MG: 25 TABLET, FILM COATED ORAL at 09:54

## 2017-04-15 RX ADMIN — BUDESONIDE AND FORMOTEROL FUMARATE DIHYDRATE SCH PUFF: 160; 4.5 AEROSOL RESPIRATORY (INHALATION) at 09:55

## 2017-04-15 RX ADMIN — METHYLPREDNISOLONE SODIUM SUCCINATE SCH MG: 40 INJECTION, POWDER, FOR SOLUTION INTRAMUSCULAR; INTRAVENOUS at 22:04

## 2017-04-15 RX ADMIN — METHYLPREDNISOLONE SODIUM SUCCINATE SCH MG: 40 INJECTION, POWDER, FOR SOLUTION INTRAMUSCULAR; INTRAVENOUS at 01:28

## 2017-04-15 RX ADMIN — CEFTRIAXONE SCH MLS/HR: 1 INJECTION, POWDER, FOR SOLUTION INTRAMUSCULAR; INTRAVENOUS at 22:03

## 2017-04-15 RX ADMIN — IPRATROPIUM BROMIDE AND ALBUTEROL SULFATE SCH AMP: .5; 3 SOLUTION RESPIRATORY (INHALATION) at 11:40

## 2017-04-15 RX ADMIN — IPRATROPIUM BROMIDE AND ALBUTEROL SULFATE SCH AMP: .5; 3 SOLUTION RESPIRATORY (INHALATION) at 00:46

## 2017-04-15 RX ADMIN — MONTELUKAST SODIUM SCH MG: 10 TABLET, COATED ORAL at 22:04

## 2017-04-15 RX ADMIN — IPRATROPIUM BROMIDE AND ALBUTEROL SULFATE SCH AMP: .5; 3 SOLUTION RESPIRATORY (INHALATION) at 07:03

## 2017-04-15 RX ADMIN — NYSTATIN CREAM SCH APPLIC: 100000 CREAM TOPICAL at 22:04

## 2017-04-15 RX ADMIN — METHYLPREDNISOLONE SODIUM SUCCINATE SCH MG: 40 INJECTION, POWDER, FOR SOLUTION INTRAMUSCULAR; INTRAVENOUS at 09:54

## 2017-04-15 RX ADMIN — VERAPAMIL HYDROCHLORIDE SCH MG: 240 TABLET, FILM COATED, EXTENDED RELEASE ORAL at 22:04

## 2017-04-15 RX ADMIN — RIVAROXABAN SCH MG: 20 TABLET, FILM COATED ORAL at 09:54

## 2017-04-15 RX ADMIN — CITALOPRAM HYDROBROMIDE SCH MG: 10 TABLET ORAL at 09:55

## 2017-04-15 RX ADMIN — IPRATROPIUM BROMIDE AND ALBUTEROL SULFATE SCH AMP: .5; 3 SOLUTION RESPIRATORY (INHALATION) at 17:29

## 2017-04-15 RX ADMIN — VERAPAMIL HYDROCHLORIDE SCH MG: 240 TABLET, FILM COATED, EXTENDED RELEASE ORAL at 09:54

## 2017-04-15 RX ADMIN — GUAIFENESIN AND DEXTROMETHORPHAN HYDROBROMIDE PRN ML: 100; 10 SOLUTION ORAL at 09:55

## 2017-04-15 NOTE — PN
Progress Note (short form)





- Note


Progress Note: 


PULMONARY





Breathing continues to improve but still with wheezing and coughing.





 Last Vital Signs











Temp Pulse Resp BP Pulse Ox


 


 98.4 F   95 H  20   126/64   96 


 


 04/15/17 07:29  04/15/17 07:29  04/15/17 07:29  04/15/17 07:29  04/14/17 21:00








Gen:  NAD at rest


Heart:  RRR


Lung: less rhonchi, wheezes


Abd: soft, nontender


Ext: no edema





 CBC, BMP





 04/15/17 06:00 





 04/15/17 08:00 





Active Medications





Albuterol Sulfate (Ventolin 0.083% Nebulizer Soln -)  1 amp NEB Q4H PRN


   PRN Reason: SHORT OF BREATH/WHEEZING


Albuterol/Ipratropium (Duoneb -)  1 amp NEB QIDR [facility]


   Last Admin: 04/15/17 07:03 Dose:  1 amp


Budesonide/Formoterol Fumarate (Symbicort 160/4.5mcg -)  1 puff IH DAILY [facility]


   Last Admin: 04/15/17 09:55 Dose:  1 puff


Citalopram Hydrobromide (Celexa -)  10 mg PO DAILY [facility]


   Last Admin: 04/15/17 09:55 Dose:  10 mg


Clonazepam (Klonopin -)  0.5 mg PO HS [facility]


   Last Admin: 04/14/17 22:09 Dose:  0.5 mg


Guaifenesin (Diabetic Tussin Dm -)  10 ml PO Q4H PRN


   PRN Reason: COUGH


   Last Admin: 04/15/17 09:55 Dose:  10 ml


Methylprednisolone Sodium Succinate (Solu-Medrol -)  40 mg IVPB Q8H-IV [facility]


   Last Admin: 04/15/17 09:54 Dose:  40 mg


Montelukast Sodium (Singulair -)  10 mg PO HS [facility]


   Last Admin: 04/14/17 22:09 Dose:  10 mg


Rivaroxaban (Xarelto -)  20 mg PO DAILY [facility]


   Last Admin: 04/15/17 09:54 Dose:  20 mg


Spironolactone (Aldactone -)  25 mg PO DAILY [facility]


   Last Admin: 04/15/17 09:54 Dose:  25 mg


Verapamil HCl (Calan Sr -)  240 mg PO BID [facility]


   Last Admin: 04/15/17 09:54 Dose:  240 mg





A/P


Acute Asthma Exacerbation


Atrial Fibrillation


CAD


LV Diastolic Dysfunction


HTN


Hyperlipidemia





-  will decrease medrol to q12h


-  inhaled bronchodilators standing and PRN


-  monitor daily peak flow


-  O2 as needed to keep SpO2 >90%


-  rate control


-  continue anticoagulation








Problem List





- Problems


(1) Acute asthma exacerbation


Code(s): J45.901 - UNSPECIFIED ASTHMA WITH (ACUTE) EXACERBATION   





(2) Atrial fibrillation


Code(s): I48.91 - UNSPECIFIED ATRIAL FIBRILLATION   





(3) CAD (coronary artery disease)


Code(s): I25.10 - ATHSCL HEART DISEASE OF NATIVE CORONARY ARTERY W/O ANG PCTRS 

  





(4) HLD (hyperlipidemia)


Code(s): E78.5 - HYPERLIPIDEMIA, UNSPECIFIED   





(5) HTN (hypertension)


Code(s): I10 - ESSENTIAL (PRIMARY) HYPERTENSION

## 2017-04-15 NOTE — PN
Progress Note, Physician


History of Present Illness: 


coughing..better





- Current Medication List


Current Medications: 


Active Medications





Budesonide/Formoterol Fumarate (Symbicort 160/4.5mcg -)  1 puff IH DAILY [facility]


   Last Admin: 04/15/17 09:55 Dose:  1 puff


Citalopram Hydrobromide (Celexa -)  10 mg PO DAILY [facility]


   Last Admin: 04/15/17 09:55 Dose:  10 mg


Clonazepam (Klonopin -)  0.5 mg PO HS [facility]


   Last Admin: 04/14/17 22:09 Dose:  0.5 mg


Guaifenesin (Diabetic Tussin Dm -)  10 ml PO Q4H PRN


   PRN Reason: COUGH


   Last Admin: 04/15/17 09:55 Dose:  10 ml


Methylprednisolone Sodium Succinate (Solu-Medrol -)  40 mg IVPB BID [facility]


Montelukast Sodium (Singulair -)  10 mg PO HS [facility]


   Last Admin: 04/14/17 22:09 Dose:  10 mg


Nystatin (Mycostatin Cream -)  1 applic TP BID [facility]


Rivaroxaban (Xarelto -)  20 mg PO DAILY [facility]


   Last Admin: 04/15/17 09:54 Dose:  20 mg


Spironolactone (Aldactone -)  25 mg PO DAILY [facility]


   Last Admin: 04/15/17 09:54 Dose:  25 mg


Verapamil HCl (Calan Sr -)  240 mg PO BID [facility]


   Last Admin: 04/15/17 09:54 Dose:  240 mg











- Objective


Vital Signs: 


 Vital Signs











Temperature  98.4 F   04/15/17 14:43


 


Pulse Rate  101 H  04/15/17 14:43


 


Respiratory Rate  20   04/15/17 14:43


 


Blood Pressure  113/84   04/15/17 14:43


 


O2 Sat by Pulse Oximetry (%)  96   04/15/17 09:00











Constitutional: Yes: No Distress


HENT: Yes: Atraumatic


Neck: Yes: Supple


Cardiovascular: Yes: Regular Rate and Rhythm


Respiratory: Yes: CTA Bilaterally


Gastrointestinal: Yes: Normal Bowel Sounds


Extremities: Yes: WNL


Neurological: Yes: Alert, Oriented


Labs: 


 CBC, BMP





 04/15/17 06:00 





 04/15/17 08:00 











Problem List





- Problems


(1) Acute asthma exacerbation


Code(s): J45.901 - UNSPECIFIED ASTHMA WITH (ACUTE) EXACERBATION   





(2) CAD (coronary artery disease)


Code(s): I25.10 - ATHSCL HEART DISEASE OF NATIVE CORONARY ARTERY W/O ANG PCTRS 

  





(3) Chest pain


Code(s): R07.9 - CHEST PAIN, UNSPECIFIED   





(4) CHF exacerbation


Code(s): I50.9 - HEART FAILURE, UNSPECIFIED   Qualifiers: 


     Congestive heart failure type: unspecified congestive heart failure type  

   Qualified Code(s): I50.9 - Heart failure, unspecified  





(5) HLD (hyperlipidemia)


Code(s): E78.5 - HYPERLIPIDEMIA, UNSPECIFIED   





(6) HTN (hypertension)


Code(s): I10 - ESSENTIAL (PRIMARY) HYPERTENSION   





(7) Bronchitis


Code(s): J40 - BRONCHITIS, NOT SPECIFIED AS ACUTE OR CHRONIC








Assessment/Plan








Assessment/Plan


61 year old woman with a history of chronic asthma, chronic diastolic CHF, 

paroxysmal atrial fibrillation, HTN, HLD, non-obstructive CAD on cardiac cath 1/ 2014, h/o of an episode of possible myocarditis, admitted with sob.





Acute Asthma Exacerbation


Atrial Fibrillation


CAD


LV Diastolic Dysfunction


HTN


Hyperlipidemia


CHF





PLAN


continue duo nebs


iv steroids..taper


home meds


diuretics


will start abx as pt cough is getting worse..possible bronchtis











-

## 2017-04-15 NOTE — PN
Progress Note, Physician


History of Present Illness: 


seen and examined today in nad. overall improving, voice hoarse today. no 

overnight events. no new complaints.








- Current Medication List


Current Medications: 


Active Medications





Albuterol Sulfate (Ventolin 0.083% Nebulizer Soln -)  1 amp NEB Q4H PRN


   PRN Reason: SHORT OF BREATH/WHEEZING


Albuterol/Ipratropium (Duoneb -)  [facility] amp [facility]B [facility]R [facility]


   Last Admin: 04/15/17 07:03 Dose:  1 amp


Budesonide/Formoterol Fumarate (Symbicort 160/4.5mcg -)  1 puff IH [facility]ILY [facility]


   Last Admin: 04/15/17 09:55 Dose:  1 puff


Citalopram Hydrobromide (Celexa -)  10 mg PO DAILY [facility]


   Last Admin: 04/15/17 09:55 Dose:  10 mg


Clonazepam (Klonopin -)  0.5 mg PO HS [facility]


   Last Admin: 04/14/17 22:09 Dose:  0.5 mg


Guaifenesin (Diabetic Tussin Dm -)  10 ml PO Q4H PRN


   PRN Reason: COUGH


   Last Admin: 04/15/17 09:55 Dose:  10 ml


Methylprednisolone Sodium Succinate (Solu-Medrol -)  40 mg IVPB BID [facility]


Montelukast Sodium (Singulair -)  10 mg PO HS [facility]


   Last Admin: 04/14/17 22:09 Dose:  10 mg


Rivaroxaban (Xarelto -)  20 mg PO DAILY [facility]


   Last Admin: 04/15/17 09:54 Dose:  20 mg


Spironolactone (Aldactone -)  25 mg PO DAILY [facility]


   Last Admin: 04/15/17 09:54 Dose:  25 mg


Verapamil HCl (Calan Sr -)  240 mg PO BID [facility]


   Last Admin: 04/15/17 09:54 Dose:  240 mg











- Objective


Vital Signs: 


 Vital Signs











Temperature  98.4 F   04/15/17 07:29


 


Pulse Rate  95 H  04/15/17 07:29


 


Respiratory Rate  20   04/15/17 07:29


 


Blood Pressure  126/64   04/15/17 07:29


 


O2 Sat by Pulse Oximetry (%)  96   04/14/17 21:00











Constitutional: Yes: No Distress, Calm, Obese


Eyes: Yes: Conjunctiva Clear, EOM Intact, PERRL


HENT: Yes: Atraumatic, Normocephalic


Neck: Yes: Supple, Trachea Midline


Cardiovascular: Yes: Regular Rate and Rhythm, S1, S2.  No: Bradycardia, 

Tachycardia, Pulse Irregular, Bruit, JVD, Gallop, Murmur, Rub, S3, S4, 

Varicosities


Respiratory: Yes: Regular, Rhonchi.  No: Rales, Wheezes


Gastrointestinal: Yes: WNL, Normal Bowel Sounds, Soft.  No: Distention, 

Tenderness


Musculoskeletal: Yes: WNL


Extremities: Yes: WNL


Edema: No


Peripheral Pulses WNL: Yes


Peripheral Pulses: Left Doralis Pedis: 2+, Right Dorsalis Pedis: 2+


Integumentary: Yes: WNL


Neurological: Yes: Alert, Oriented, Cran Nerves II-XII Intact


Psychiatric: Yes: Alert, Oriented


Labs: 


 CBC, BMP





 04/15/17 06:00 





 04/15/17 08:00 











- ....Imaging


Chest X-ray: Report Reviewed, Image Reviewed


EKG: Report Reviewed, Image Reviewed


Other: Report Reviewed, Image Reviewed





Assessment/Plan


61 year old woman with a history of chronic asthma, chronic diastolic CHF, 

paroxysmal atrial fibrillation, HTN, HLD, non-obstructive CAD on cardiac cath 1/ 2014, h/o of an episode of possible myocarditis, admitted with sob.





SOB-asthma exacerbation


-improving, pulm f/up appreciated


-no sign of decompensated CHF


-does not require diuresis


-echo can be done as outpatient





Atrial fibrillation-paroxysmal, HR currently well controlled, brief episodes of 

RVR yesterday


-HR likely exacerbated by SOB/Asthma exacerbation and has improved with 

improvement in SOB


-cont Verapamil ER 240mg bid with plan to dc home on this dose


-f/up plan as per my previous note


-cont Xarelto


-ok from a cardiac standpoint for discharge home





CAD-non-obstructive as above, stable


-no chest pain


-ekg abnormal but the abnormalities are chronic, unchanged from prior ekgs


-cont home ASA 81mg daily


-did not tolerate bblocker in the past


-outpatient f/up





HTN-adequately controlled


-tolerating uptitrated dose of verapamil


-cont current medical regimen

## 2017-04-16 RX ADMIN — BENZOCAINE AND MENTHOL PRN EACH: 15; 3.6 LOZENGE ORAL at 06:28

## 2017-04-16 RX ADMIN — IPRATROPIUM BROMIDE AND ALBUTEROL SULFATE PRN AMP: .5; 3 SOLUTION RESPIRATORY (INHALATION) at 14:00

## 2017-04-16 RX ADMIN — BENZOCAINE AND MENTHOL PRN EACH: 15; 3.6 LOZENGE ORAL at 10:02

## 2017-04-16 RX ADMIN — IPRATROPIUM BROMIDE AND ALBUTEROL SULFATE PRN AMP: .5; 3 SOLUTION RESPIRATORY (INHALATION) at 22:38

## 2017-04-16 RX ADMIN — VERAPAMIL HYDROCHLORIDE SCH MG: 240 TABLET, FILM COATED, EXTENDED RELEASE ORAL at 21:54

## 2017-04-16 RX ADMIN — GUAIFENESIN AND DEXTROMETHORPHAN HYDROBROMIDE PRN ML: 100; 10 SOLUTION ORAL at 10:01

## 2017-04-16 RX ADMIN — METHYLPREDNISOLONE SODIUM SUCCINATE SCH MG: 40 INJECTION, POWDER, FOR SOLUTION INTRAMUSCULAR; INTRAVENOUS at 21:54

## 2017-04-16 RX ADMIN — VERAPAMIL HYDROCHLORIDE SCH MG: 240 TABLET, FILM COATED, EXTENDED RELEASE ORAL at 09:58

## 2017-04-16 RX ADMIN — RIVAROXABAN SCH MG: 20 TABLET, FILM COATED ORAL at 09:58

## 2017-04-16 RX ADMIN — MONTELUKAST SODIUM SCH MG: 10 TABLET, COATED ORAL at 21:54

## 2017-04-16 RX ADMIN — NYSTATIN CREAM SCH APPLIC: 100000 CREAM TOPICAL at 22:02

## 2017-04-16 RX ADMIN — CITALOPRAM HYDROBROMIDE SCH MG: 10 TABLET ORAL at 09:58

## 2017-04-16 RX ADMIN — SPIRONOLACTONE SCH MG: 25 TABLET, FILM COATED ORAL at 09:58

## 2017-04-16 RX ADMIN — GUAIFENESIN AND DEXTROMETHORPHAN HYDROBROMIDE PRN ML: 100; 10 SOLUTION ORAL at 05:55

## 2017-04-16 RX ADMIN — BUDESONIDE AND FORMOTEROL FUMARATE DIHYDRATE SCH PUFF: 160; 4.5 AEROSOL RESPIRATORY (INHALATION) at 10:00

## 2017-04-16 RX ADMIN — METHYLPREDNISOLONE SODIUM SUCCINATE SCH MG: 40 INJECTION, POWDER, FOR SOLUTION INTRAMUSCULAR; INTRAVENOUS at 09:59

## 2017-04-16 RX ADMIN — GUAIFENESIN AND DEXTROMETHORPHAN HYDROBROMIDE PRN ML: 100; 10 SOLUTION ORAL at 22:28

## 2017-04-16 RX ADMIN — CEFTRIAXONE SCH MLS/HR: 1 INJECTION, POWDER, FOR SOLUTION INTRAMUSCULAR; INTRAVENOUS at 09:58

## 2017-04-16 RX ADMIN — NYSTATIN CREAM SCH APPLIC: 100000 CREAM TOPICAL at 10:00

## 2017-04-16 NOTE — PN
Progress Note, Physician


History of Present Illness: 


coughing..better





- Current Medication List


Current Medications: 


Active Medications





Albuterol/Ipratropium (Duoneb -)  1 amp NEB Q4H PRN


   Last Admin: 04/16/17 14:00 Dose:  1 amp


Benzocaine/Menthol (Cepacol Lozenge -)  1 each MM PRN PRN


   PRN Reason: SORE THROAT


   Stop: 04/18/17 05:51


   Last Admin: 04/16/17 10:02 Dose:  1 each


Budesonide/Formoterol Fumarate (Symbicort 160/4.5mcg -)  1 puff IH DAILY [facility]


   Last Admin: 04/16/17 10:00 Dose:  1 puff


Citalopram Hydrobromide (Celexa -)  10 mg PO DAILY [facility]


   Last Admin: 04/16/17 09:58 Dose:  10 mg


Clonazepam (Klonopin -)  0.5 mg PO HS [facility]


   Last Admin: 04/15/17 22:04 Dose:  0.5 mg


Guaifenesin (Diabetic Tussin Dm -)  10 ml PO Q4H PRN


   PRN Reason: COUGH


   Last Admin: 04/16/17 10:01 Dose:  10 ml


Ceftriaxone Sodium (Rocephin 1gm Ivpb (Pre-Docked))  50 mls @ 100 mls/hr IVPB 

DAILY [facility]


   Last Admin: 04/16/17 09:58 Dose:  100 mls/hr


Methylprednisolone Sodium Succinate (Solu-Medrol -)  40 mg IVPB BID [facility]


   Last Admin: 04/16/17 09:59 Dose:  40 mg


Montelukast Sodium (Singulair -)  10 mg PO HS [facility]


   Last Admin: 04/15/17 22:04 Dose:  10 mg


Nystatin (Mycostatin Cream -)  1 applic TP BID [facility]


   Last Admin: 04/16/17 10:00 Dose:  1 applic


Rivaroxaban (Xarelto -)  20 mg PO DAILY [facility]


   Last Admin: 04/16/17 09:58 Dose:  20 mg


Spironolactone (Aldactone -)  25 mg PO DAILY [facility]


   Last Admin: 04/16/17 09:58 Dose:  25 mg


Verapamil HCl (Calan Sr -)  240 mg PO BID [facility]


   Last Admin: 04/16/17 09:58 Dose:  240 mg











- Objective


Vital Signs: 


 Vital Signs











Temperature  98.0 F   04/16/17 14:49


 


Pulse Rate  102 H  04/16/17 14:49


 


Respiratory Rate  16   04/16/17 14:49


 


Blood Pressure  100/62   04/16/17 14:49


 


O2 Sat by Pulse Oximetry (%)  98   04/16/17 09:00











Constitutional: Yes: No Distress


HENT: Yes: Atraumatic


Neck: Yes: Supple


Cardiovascular: Yes: Regular Rate and Rhythm


Respiratory: Yes: Rhonchi


Gastrointestinal: Yes: Normal Bowel Sounds


Extremities: Yes: WNL


Neurological: Yes: Alert, Oriented


Labs: 


 CBC, BMP





 04/15/17 06:00 





 04/15/17 08:00 











Problem List





- Problems


(1) Acute asthma exacerbation


Assessment/Plan: 


duo nebs


iv steroids


Code(s): J45.901 - UNSPECIFIED ASTHMA WITH (ACUTE) EXACERBATION   





(2) CAD (coronary artery disease)


Assessment/Plan: 


stable


on meds


Code(s): I25.10 - ATHSCL HEART DISEASE OF NATIVE CORONARY ARTERY W/O ANG PCTRS 

  





(3) Chest pain


Code(s): R07.9 - CHEST PAIN, UNSPECIFIED   





(4) CHF exacerbation


Assessment/Plan: 


diuretics


stable


Code(s): I50.9 - HEART FAILURE, UNSPECIFIED   Qualifiers: 


     Qualified Code(s): I50.9 - Heart failure, unspecified  





(5) HLD (hyperlipidemia)


Assessment/Plan: 


on meds stable


Code(s): E78.5 - HYPERLIPIDEMIA, UNSPECIFIED   





(6) HTN (hypertension)


Assessment/Plan: 


on meds stable


Code(s): I10 - ESSENTIAL (PRIMARY) HYPERTENSION   





(7) Bronchitis


Code(s): J40 - BRONCHITIS, NOT SPECIFIED AS ACUTE OR CHRONIC








Assessment/Plan


Assessment/Plan


61 year old woman with a history of chronic asthma, chronic diastolic CHF, 

paroxysmal atrial fibrillation, HTN, HLD, non-obstructive CAD on cardiac cath 1/ 2014, h/o of an episode of possible myocarditis, admitted with sob.





Acute Asthma Exacerbation


Atrial Fibrillation


CAD


LV Diastolic Dysfunction


HTN


Hyperlipidemia


CHF





PLAN


continue duo nebs


iv steroids..taper


home meds


diuretics


will start abx as pt cough is getting worse..possible bronchtis..cxr done today 

report pending











-

## 2017-04-16 NOTE — PN
Progress Note, Physician


History of Present Illness: 


seen and examined today in nad. sob with difficulty clearing mucous overnight. 

feels better now.








- Current Medication List


Current Medications: 


Active Medications





Benzocaine/Menthol (Cepacol Lozenge -)  1 each MM PRN PRN


   PRN Reason: SORE THROAT


   Stop: 04/18/17 05:51


   Last Admin: 04/16/17 06:28 Dose:  1 each


Budesonide/Formoterol Fumarate (Symbicort 160/4.5mcg -)  1 puff IH DAILY [facility]


   Last Admin: 04/15/17 09:55 Dose:  1 puff


Citalopram Hydrobromide (Celexa -)  10 mg PO DAILY [facility]


   Last Admin: 04/15/17 09:55 Dose:  10 mg


Clonazepam (Klonopin -)  0.5 mg PO HS [facility]


   Last Admin: 04/15/17 22:04 Dose:  0.5 mg


Guaifenesin (Diabetic Tussin Dm -)  10 ml PO Q4H PRN


   PRN Reason: COUGH


   Last Admin: 04/16/17 05:55 Dose:  10 ml


Ceftriaxone Sodium (Rocephin 1gm Ivpb (Pre-Docked))  50 mls @ 100 mls/hr IVPB 

DAILY [facility]


   Last Admin: 04/15/17 22:03 Dose:  100 mls/hr


Methylprednisolone Sodium Succinate (Solu-Medrol -)  40 mg IVPB BID [facility]


   Last Admin: 04/15/17 22:04 Dose:  40 mg


Montelukast Sodium (Singulair -)  10 mg PO HS [facility]


   Last Admin: 04/15/17 22:04 Dose:  10 mg


Nystatin (Mycostatin Cream -)  1 applic TP BID [facility]


   Last Admin: 04/15/17 22:04 Dose:  1 applic


Rivaroxaban (Xarelto -)  20 mg PO DAILY [facility]


   Last Admin: 04/15/17 09:54 Dose:  20 mg


Spironolactone (Aldactone -)  25 mg PO DAILY [facility]


   Last Admin: 04/15/17 09:54 Dose:  25 mg


Verapamil HCl (Calan Sr -)  240 mg PO BID [facility]


   Last Admin: 04/15/17 22:04 Dose:  240 mg











- Objective


Vital Signs: 


 Vital Signs











Temperature  97.9 F   04/16/17 08:05


 


Pulse Rate  85   04/16/17 08:05


 


Respiratory Rate  18   04/16/17 08:05


 


Blood Pressure  108/60   04/16/17 08:05


 


O2 Sat by Pulse Oximetry (%)  96   04/15/17 21:00











Constitutional: Yes: No Distress, Calm, Obese


Eyes: Yes: Conjunctiva Clear, EOM Intact, PERRL


HENT: Yes: Atraumatic, Normocephalic


Neck: Yes: Supple, Trachea Midline


Cardiovascular: Yes: Pulse Irregular, S1, S2.  No: Regular Rate and Rhythm, 

Bradycardia, Tachycardia, Bruit, JVD, Gallop, Murmur, Rub, S3, S4, Varicosities


Respiratory: Yes: Regular, On Nasal O2, Rhonchi, Wheezes.  No: Rales, SOB


Gastrointestinal: Yes: Normal Bowel Sounds, Soft.  No: Distention, Tenderness


Musculoskeletal: Yes: WNL


Extremities: Yes: WNL


Edema: No


Peripheral Pulses WNL: Yes


Peripheral Pulses: Left Doralis Pedis: 2+, Right Dorsalis Pedis: 2+


Integumentary: Yes: WNL


Neurological: Yes: Alert, Oriented, Cran Nerves II-XII Intact


Psychiatric: Yes: Alert, Oriented


Labs: 


 CBC, BMP





 04/15/17 06:00 





 04/15/17 08:00 











- ....Imaging


Chest X-ray: Report Reviewed, Image Reviewed


EKG: Report Reviewed, Image Reviewed


Other: Report Reviewed, Image Reviewed





Assessment/Plan


61 year old woman with a history of chronic asthma, chronic diastolic CHF, 

paroxysmal atrial fibrillation, HTN, HLD, non-obstructive CAD on cardiac cath 1/ 2014, h/o of an episode of possible myocarditis, admitted with sob.





SOB-asthma exacerbation


-improving, pulm f/up appreciated


-check flu swab


-no sign of decompensated CHF


-does not require diuresis


-echo can be done as outpatient





Atrial fibrillation-paroxysmal, HR currently well controlled, brief episodes of 

RVR yesterday


-HR likely exacerbated by SOB/Asthma exacerbation and has improved with 

improvement in SOB


-cont Verapamil ER 240mg bid with plan to dc home on this dose


-f/up plan as per my previous note


-cont Xarelto


-ok from a cardiac standpoint for discharge home





CAD-non-obstructive as above, stable


-no chest pain


-ekg abnormal but the abnormalities are chronic, unchanged from prior ekgs


-cont home ASA 81mg daily


-did not tolerate bblocker in the past


-outpatient f/up





HTN-adequately controlled


-tolerating uptitrated dose of verapamil


-cont current medical regimen

## 2017-04-16 NOTE — CONSULT
Consult


Consult Specialty:: infectious diseases


Reason for Consultation:: pneumonia





- History of Present Illness


Chief Complaint: cough with sputum


History of Present Illness: 


61 year old female c/o SOB, mild chest pain, asthma symptoms, wheezing. she was 

initially seen  ER with asthma/COPD exacerbation where she was treated with 

steriods and inhalers and discharged to home.  Now, pt and daughter at bedside 

states she feels worse then 2 days ago. 


patient got admitted to the hospital and has been seen by pulmonary and 

treatment being done and being treated as copd exacerbation


also patients wbc has increased and cough still persisting with sputum 

production


patients daughter by bedside says she is not improving at all








- History Source


History Provided By: Patient, Family Member


Limitations to Obtaining History: Language Barrier





- Past Medical History


Cardio/Vascular: Yes: AFIB, CAD, CHF, HTN, Hyperlipdemia


Pulmonary: Yes: Asthma


Gastrointestinal: Yes: Peptic Ulcer Disease


...Pregnant: No


Psych: Yes: Bipolar





- Alcohol/Substance Use


Hx Alcohol Use: No





- Smoking History


Smoking history: Former smoker


Have you smoked in the past 12 months: No


Aproximately how many cigarettes per day: 6


If you are a former smoker, when did you quit?: 1986





- Social History


Usual Living Arrangement: With Child


ADL: Independent


History of Recent Travel: No





Home Medications





- Allergies


Allergies/Adverse Reactions: 


 Allergies











Allergy/AdvReac Type Severity Reaction Status Date / Time


 


furosemide [From Lasix] Allergy Intermediate Itching Verified 04/10/17 06:33














- Home Medications


Home Medications: 


Ambulatory Orders





Citalopram Hydrobromide [Celexa -] 10 mg PO DAILY 03/06/17 


Clonazepam [Klonopin -] 0.5 mg PO DAILY [facility] 03/06/17 


Spironolactone [Aldactone -] 25 mg PO DAILY 03/06/17 


Verapamil HCl [Verapamil ER Pm] 300 mg PO DAILY 03/06/17 


Rivaroxaban [Xarelto] 1 each PO DAILY 04/07/17 


Albuterol 0.083% Nebulizer Sol [Ventolin 0.083% Nebulizer Soln -] 1 neb NEB Q6H 

#40 vial 04/08/17 


Albuterol Sulfate [Proair Respiclick] 90 mcg IH Q4H 04/08/17 


Budesonide/Formeterol Fumarate [SYMBICORT 160/4.5mcg -] 1 puff IH DAILY 04/08/ 17 


Prednisone [Deltasone -] 40 mg PO UTDICT #10 tablet 04/08/17 











Review of Systems





- Review of Systems


Constitutional: reports: No Symptoms


Eyes: reports: No Symptoms


HENT: reports: No Symptoms


Neck: reports: No Symptoms


Cardiovascular: reports: No Symptoms


Respiratory: reports: Cough, SOB, SOB on Exertion


Gastrointestinal: reports: No Symptoms


Genitourinary: reports: No Symptoms


Musculoskeletal: reports: No Symptoms


Integumentary: reports: No Symptoms


Neurological: reports: No Symptoms


Endocrine: reports: No Symptoms


Hematology/Lymphatic: reports: No Symptoms


Psychiatric: reports: No Symptoms





Physical Exam


Vital Signs: 


 Vital Signs











Temperature  97.9 F   04/16/17 08:05


 


Pulse Rate  85   04/16/17 08:05


 


Respiratory Rate  18   04/16/17 08:05


 


Blood Pressure  108/60   04/16/17 08:05


 


O2 Sat by Pulse Oximetry (%)  98   04/16/17 09:00











Constitutional: Yes: Calm, Mild Distress, Obese


Eyes: Yes: Conjunctiva Clear


HENT: Yes: Atraumatic


Neck: Yes: Supple, Trachea Midline


Cardiovascular: Yes: Pulse Irregular, S1, S2


Respiratory: Yes: Regular, Poor Air Entry, Rhonchi, Wheezes


Gastrointestinal: Yes: Normal Bowel Sounds, Soft


Musculoskeletal: Yes: WNL


Extremities: Yes: WNL


Integumentary: Yes: WNL


Neurological: Yes: Alert, Oriented


Psychiatric: Yes: Alert, Oriented


Labs: 


 CBC, BMP





 04/15/17 06:00 





 04/15/17 08:00 











Imaging





- Results


Cat Scan: Report Reviewed, Image Reviewed





Assessment/Plan


- Problems


(1) Acute asthma exacerbation


Code(s): J45.901 - UNSPECIFIED ASTHMA WITH (ACUTE) EXACERBATION   





(2) Atrial fibrillation


Code(s): I48.91 - UNSPECIFIED ATRIAL FIBRILLATION   





(3) CAD (coronary artery disease)


Code(s): I25.10 - ATHSCL HEART DISEASE OF NATIVE CORONARY ARTERY W/O ANG PCTRS 

  





(4) HLD (hyperlipidemia)


Code(s): E78.5 - HYPERLIPIDEMIA, UNSPECIFIED   





(5) HTN (hypertension)


Code(s): I10 - ESSENTIAL (PRIMARY) HYPERTENSION   








a/p


afib


asthma exacerebration


sob


htn





at this time i do not see signs of infection


will not start any abx





plan


continue current mgmt


will order stat xray chest to see if any new findings

## 2017-04-16 NOTE — HOSP
Subjective





- Review of Symptoms


Pulmonary: Yes: Cough


Cardiovascular: No: Chest Pain, Palpitations, Light Headedness


Gastrointestinal: No: Nausea, Vomiting, Abdominal Pain


Musculoskeletal: Yes: No Symptoms





Physical Examination


Vital Signs: 


 Vital Signs











Temperature  98 F   04/15/17 22:00


 


Pulse Rate  82   04/15/17 22:00


 


Respiratory Rate  20   04/15/17 21:00


 


Blood Pressure  124/75   04/15/17 22:00


 


O2 Sat by Pulse Oximetry (%)  96   04/15/17 21:00











Constitutional: Yes: Calm, Mild Distress


Eyes: Yes: Conjunctiva Clear, EOM Intact


HENT: Yes: Atraumatic, Normocephalic


Neck: Yes: Supple, Trachea Midline, Other (thick neck)


Cardiovascular: Yes: Regular Rate and Rhythm, S1, S2


Respiratory: Yes: Rales, Rhonchi, Wheezes


Gastrointestinal: Yes: Soft, Abdomen, Obese


Extremities: Yes: WNL


Edema: LLE: Trace


Peripheral Pulses: Left Radial: 2+, Right Radial: 2+, Left Doralis Pedis: 2+, 

Right Dorsalis Pedis: 2+


Neurological: Yes: Alert, Oriented, Cran Nerves II-XII Intact


...Motor Strength: WNL


Psychiatric: Yes: Alert, Oriented


Labs: 


 CBC, BMP





 04/15/17 06:00 





 04/15/17 08:00 











Hospitalist Encounter


Assessment: 


Requested by Dr. Drake to evaluate patient.





Patient says she is having trouble bring up mucus, feels like it is preventing 

her from breathing, was unable to sleep last night due to cough. 


She feels like she has chest congestion and c/o sore throat.


Denies anxiety, but feels restless due to breathing problems


Denies chest pain, palpitations, lightheadedness, dizziness, nausea, headache.


Gen: mild distress, using nasal cannula


Lungs: scattered wheezing, rhonchi b/l. not using accessory muscle to breath, 

able to speak in full sentences in whispered speech, pulse ox 100% on 2lpm 

nasal cannula.


ENT: no pharyngeal exudates/erythema, no LAD, neck supple, trachea midline.


CV: irregularily irregular, S1, s2


Abd: obese, soft, nontender


Neuro: CN2-12 grossly intact, strength 5/5 at hand , hip extension/flexion. 

sensation intact, facial symmetry. 








Primary Physician Notified: Kush Drake


Recommendations/Interventions: 


Duonebs, chest PT, guanefosin DM, cephacol


f/u in the morning with primary team for re-evaluation of cough and sob.





Visit type





- Emergency Visit


Emergency Visit: No





- New Patient


This patient is new to me today: Yes


Date on this admission: 04/16/17





- Critical Care


Critical Care patient: No

## 2017-04-16 NOTE — PN
Progress Note (short form)





- Note


Progress Note: 


PULMONARY





Breathing continues to improve but still with wheezing and coughing.





 Last Vital Signs











Temp Pulse Resp BP Pulse Ox


 


 97.9 F   85   18   108/60   96 


 


 04/16/17 08:05  04/16/17 08:05  04/16/17 08:05  04/16/17 08:05  04/15/17 21:00











Gen:  NAD at rest


Heart:  RRR


Lung: less rhonchi, wheezes


Abd: soft, nontender


Ext: no edema





 CBC, BMP





 04/15/17 06:00 





 04/15/17 08:00 





Active Medications





Benzocaine/Menthol (Cepacol Lozenge -)  1 each MM PRN PRN


   PRN Reason: SORE THROAT


   Stop: 04/18/17 05:51


   Last Admin: 04/16/17 10:02 Dose:  1 each


Budesonide/Formoterol Fumarate (Symbicort 160/4.5mcg -)  1 puff IH DAILY [facility]


   Last Admin: 04/16/17 10:00 Dose:  1 puff


Citalopram Hydrobromide (Celexa -)  10 mg PO DAILY [facility]


   Last Admin: 04/16/17 09:58 Dose:  10 mg


Clonazepam (Klonopin -)  0.5 mg PO HS [facility]


   Last Admin: 04/15/17 22:04 Dose:  0.5 mg


Guaifenesin (Diabetic Tussin Dm -)  10 ml PO Q4H PRN


   PRN Reason: COUGH


   Last Admin: 04/16/17 10:01 Dose:  10 ml


Ceftriaxone Sodium (Rocephin 1gm Ivpb (Pre-Docked))  50 mls @ 100 mls/hr IVPB 

DAILY [facility]


   Last Admin: 04/16/17 09:58 Dose:  100 mls/hr


Methylprednisolone Sodium Succinate (Solu-Medrol -)  40 mg IVPB BID [facility]


   Last Admin: 04/16/17 09:59 Dose:  40 mg


Montelukast Sodium (Singulair -)  10 mg PO HS [facility]


   Last Admin: 04/15/17 22:04 Dose:  10 mg


Nystatin (Mycostatin Cream -)  1 applic TP BID [facility]


   Last Admin: 04/16/17 10:00 Dose:  1 applic


Rivaroxaban (Xarelto -)  20 mg PO DAILY [facility]


   Last Admin: 04/16/17 09:58 Dose:  20 mg


Spironolactone (Aldactone -)  25 mg PO DAILY [facility]


   Last Admin: 04/16/17 09:58 Dose:  25 mg


Verapamil HCl (Calan Sr -)  240 mg PO BID [facility]


   Last Admin: 04/16/17 09:58 Dose:  240 mg








A/P


Acute Asthma Exacerbation


Atrial Fibrillation


CAD


LV Diastolic Dysfunction


HTN


Hyperlipidemia





-  continue medrol BID, if continues to improve, can likely change steroids to 

PO in AM


-  inhaled bronchodilators standing and PRN


-  monitor daily peak flow


-  O2 as needed to keep SpO2 >90%


-  rate control


-  continue anticoagulation








Problem List





- Problems


(1) Acute asthma exacerbation


Code(s): J45.901 - UNSPECIFIED ASTHMA WITH (ACUTE) EXACERBATION   





(2) Atrial fibrillation


Code(s): I48.91 - UNSPECIFIED ATRIAL FIBRILLATION   





(3) CAD (coronary artery disease)


Code(s): I25.10 - ATHSCL HEART DISEASE OF NATIVE CORONARY ARTERY W/O ANG PCTRS 

  





(4) HLD (hyperlipidemia)


Code(s): E78.5 - HYPERLIPIDEMIA, UNSPECIFIED   





(5) HTN (hypertension)


Code(s): I10 - ESSENTIAL (PRIMARY) HYPERTENSION

## 2017-04-17 RX ADMIN — VERAPAMIL HYDROCHLORIDE SCH MG: 240 TABLET, FILM COATED, EXTENDED RELEASE ORAL at 09:14

## 2017-04-17 RX ADMIN — SPIRONOLACTONE SCH MG: 25 TABLET, FILM COATED ORAL at 09:14

## 2017-04-17 RX ADMIN — NYSTATIN CREAM SCH APPLIC: 100000 CREAM TOPICAL at 09:15

## 2017-04-17 RX ADMIN — METHYLPREDNISOLONE SODIUM SUCCINATE SCH MG: 40 INJECTION, POWDER, FOR SOLUTION INTRAMUSCULAR; INTRAVENOUS at 22:04

## 2017-04-17 RX ADMIN — CITALOPRAM HYDROBROMIDE SCH MG: 10 TABLET ORAL at 09:19

## 2017-04-17 RX ADMIN — BUDESONIDE AND FORMOTEROL FUMARATE DIHYDRATE SCH PUFF: 160; 4.5 AEROSOL RESPIRATORY (INHALATION) at 09:19

## 2017-04-17 RX ADMIN — NYSTATIN CREAM SCH APPLIC: 100000 CREAM TOPICAL at 22:30

## 2017-04-17 RX ADMIN — VERAPAMIL HYDROCHLORIDE SCH MG: 240 TABLET, FILM COATED, EXTENDED RELEASE ORAL at 22:04

## 2017-04-17 RX ADMIN — CEFTRIAXONE SCH MLS/HR: 1 INJECTION, POWDER, FOR SOLUTION INTRAMUSCULAR; INTRAVENOUS at 09:14

## 2017-04-17 RX ADMIN — GUAIFENESIN AND DEXTROMETHORPHAN HYDROBROMIDE PRN ML: 100; 10 SOLUTION ORAL at 02:36

## 2017-04-17 RX ADMIN — METHYLPREDNISOLONE SODIUM SUCCINATE SCH MG: 40 INJECTION, POWDER, FOR SOLUTION INTRAMUSCULAR; INTRAVENOUS at 11:47

## 2017-04-17 RX ADMIN — IPRATROPIUM BROMIDE AND ALBUTEROL SULFATE PRN AMP: .5; 3 SOLUTION RESPIRATORY (INHALATION) at 19:11

## 2017-04-17 RX ADMIN — MONTELUKAST SODIUM SCH MG: 10 TABLET, COATED ORAL at 22:03

## 2017-04-17 RX ADMIN — GUAIFENESIN AND DEXTROMETHORPHAN HYDROBROMIDE PRN ML: 100; 10 SOLUTION ORAL at 17:34

## 2017-04-17 RX ADMIN — GUAIFENESIN AND DEXTROMETHORPHAN HYDROBROMIDE PRN ML: 100; 10 SOLUTION ORAL at 06:30

## 2017-04-17 RX ADMIN — IPRATROPIUM BROMIDE AND ALBUTEROL SULFATE PRN AMP: .5; 3 SOLUTION RESPIRATORY (INHALATION) at 06:14

## 2017-04-17 RX ADMIN — IPRATROPIUM BROMIDE AND ALBUTEROL SULFATE PRN AMP: .5; 3 SOLUTION RESPIRATORY (INHALATION) at 23:00

## 2017-04-17 RX ADMIN — RIVAROXABAN SCH MG: 20 TABLET, FILM COATED ORAL at 09:14

## 2017-04-17 RX ADMIN — GUAIFENESIN AND DEXTROMETHORPHAN HYDROBROMIDE PRN ML: 100; 10 SOLUTION ORAL at 12:04

## 2017-04-17 NOTE — PN
Progress Note, Physician


History of Present Illness: 


patient still continues to cough


sputum production present





- Current Medication List


Current Medications: 


Active Medications





Albuterol/Ipratropium (Duoneb -)  1 amp NEB Q4H PRN


   Last Admin: 04/17/17 06:14 Dose:  1 amp


Budesonide/Formoterol Fumarate (Symbicort 160/4.5mcg -)  1 puff IH DAILY [facility]


   Last Admin: 04/17/17 09:19 Dose:  1 puff


Citalopram Hydrobromide (Celexa -)  10 mg PO DAILY [facility]


   Last Admin: 04/17/17 09:19 Dose:  10 mg


Clonazepam (Klonopin -)  0.5 mg PO HS [facility]


   Last Admin: 04/16/17 21:54 Dose:  0.5 mg


Guaifenesin (Diabetic Tussin Dm -)  10 ml PO Q4H PRN


   PRN Reason: COUGH


   Last Admin: 04/17/17 12:04 Dose:  10 ml


Ceftriaxone Sodium (Rocephin 1gm Ivpb (Pre-Docked))  50 mls @ 100 mls/hr IVPB 

DAILY [facility]


   Last Admin: 04/17/17 09:14 Dose:  100 mls/hr


Methylprednisolone Sodium Succinate (Solu-Medrol -)  40 mg IVPB BID [facility]


   Last Admin: 04/17/17 11:47 Dose:  40 mg


Montelukast Sodium (Singulair -)  10 mg PO HS [facility]


   Last Admin: 04/16/17 21:54 Dose:  10 mg


Nystatin (Mycostatin Cream -)  1 applic TP BID [facility]


   Last Admin: 04/17/17 09:15 Dose:  1 applic


Rivaroxaban (Xarelto -)  20 mg PO DAILY [facility]


   Last Admin: 04/17/17 09:14 Dose:  20 mg


Spironolactone (Aldactone -)  25 mg PO DAILY [facility]


   Last Admin: 04/17/17 09:14 Dose:  25 mg


Verapamil HCl (Calan Sr -)  240 mg PO BID [facility]


   Last Admin: 04/17/17 09:14 Dose:  240 mg











- Objective


Vital Signs: 


 Vital Signs











Temperature  98.4 F   04/17/17 08:48


 


Pulse Rate  99 H  04/17/17 08:48


 


Respiratory Rate  18   04/17/17 08:48


 


Blood Pressure  113/70   04/17/17 08:48


 


O2 Sat by Pulse Oximetry (%)  97   04/17/17 09:00











Constitutional: Yes: No Distress, Calm


Neck: Yes: Supple, Trachea Midline


Cardiovascular: Yes: Pulse Irregular


Respiratory: Yes: Regular, Poor Air Entry, SOB, Wheezes


Gastrointestinal: Yes: Normal Bowel Sounds, Soft


Musculoskeletal: Yes: WNL


Extremities: Yes: WNL


Neurological: Yes: Alert, Oriented


Psychiatric: Yes: Alert, Oriented


Labs: 


 CBC, BMP





 04/15/17 06:00 





 04/15/17 08:00 











Assessment/Plan


- Problems


(1) Acute asthma exacerbation


Code(s): J45.901 - UNSPECIFIED ASTHMA WITH (ACUTE) EXACERBATION   





(2) Atrial fibrillation


Code(s): I48.91 - UNSPECIFIED ATRIAL FIBRILLATION   





(3) CAD (coronary artery disease)


Code(s): I25.10 - ATHSCL HEART DISEASE OF NATIVE CORONARY ARTERY W/O ANG PCTRS 

  





(4) HLD (hyperlipidemia)


Code(s): E78.5 - HYPERLIPIDEMIA, UNSPECIFIED   





(5) HTN (hypertension)


Code(s): I10 - ESSENTIAL (PRIMARY) HYPERTENSION   








a/p


afib


asthma exacerebration


sob


htn





plan


no abx at this time


continue as per pul xray seen


no findings

## 2017-04-17 NOTE — PN
Progress Note (short form)





- Note


Progress Note: 


Breathing continues to slowly improve but still with intermittent wheezing and 

coughing.


(+) MCKNIGHT. 





 Intake & Output











 04/14/17 04/15/17 04/16/17 04/17/17





 23:59 23:59 23:59 23:59


 


Intake Total 400 1200 540 120


 


Balance 400 1200 540 120


 


Weight 259 lb   








 Last Vital Signs











Temp Pulse Resp BP Pulse Ox


 


 98.4 F   99 H  18   113/70   98 


 


 04/17/17 08:48  04/17/17 08:48  04/17/17 08:48  04/17/17 08:48  04/16/17 21:00








Active Medications





Albuterol/Ipratropium (Duoneb -)  1 amp NEB Q4H PRN


   Last Admin: 04/17/17 06:14 Dose:  1 amp


Budesonide/Formoterol Fumarate (Symbicort 160/4.5mcg -)  1 puff IH DAILY [facility]


   Last Admin: 04/17/17 09:19 Dose:  1 puff


Citalopram Hydrobromide (Celexa -)  10 mg PO DAILY [facility]


   Last Admin: 04/17/17 09:19 Dose:  10 mg


Clonazepam (Klonopin -)  0.5 mg PO HS [facility]


   Last Admin: 04/16/17 21:54 Dose:  0.5 mg


Guaifenesin (Diabetic Tussin Dm -)  10 ml PO Q4H PRN


   PRN Reason: COUGH


   Last Admin: 04/17/17 12:04 Dose:  10 ml


Ceftriaxone Sodium (Rocephin 1gm Ivpb (Pre-Docked))  50 mls @ 100 mls/hr IVPB 

DAILY [facility]


   Last Admin: 04/17/17 09:14 Dose:  100 mls/hr


Methylprednisolone Sodium Succinate (Solu-Medrol -)  40 mg IVPB BID [facility]


   Last Admin: 04/17/17 11:47 Dose:  40 mg


Montelukast Sodium (Singulair -)  10 mg PO HS [facility]


   Last Admin: 04/16/17 21:54 Dose:  10 mg


Nystatin (Mycostatin Cream -)  1 applic TP BID [facility]


   Last Admin: 04/17/17 09:15 Dose:  1 applic


Rivaroxaban (Xarelto -)  20 mg PO DAILY [facility]


   Last Admin: 04/17/17 09:14 Dose:  20 mg


Spironolactone (Aldactone -)  25 mg PO DAILY [facility]


   Last Admin: 04/17/17 09:14 Dose:  25 mg


Verapamil HCl (Calan Sr -)  240 mg PO BID [facility]


   Last Admin: 04/17/17 09:14 Dose:  240 mg











Gen: NAD at rest, (+) cough 


Heart:  RRR


Lung: less rhonchi, wheezes


Abd: soft, nontender


Ext: no edema





 








Problem List





- Problems


(1) Acute asthma exacerbation


Code(s): J45.901 - UNSPECIFIED ASTHMA WITH (ACUTE) EXACERBATION   





(2) Atrial fibrillation


Code(s): I48.91 - UNSPECIFIED ATRIAL FIBRILLATION   





(3) CAD (coronary artery disease)


Code(s): I25.10 - ATHSCL HEART DISEASE OF NATIVE CORONARY ARTERY W/O ANG PCTRS 

  





(4) HLD (hyperlipidemia)


Code(s): E78.5 - HYPERLIPIDEMIA, UNSPECIFIED   





(5) HTN (hypertension)


Code(s): I10 - ESSENTIAL (PRIMARY) HYPERTENSION   








A/P


Acute Asthma Exacerbation


Atrial Fibrillation


CAD


LV Diastolic Dysfunction


HTN


Hyperlipidemia








-  Advised patient and her son at the bedside to try to ambulate her today


-  continue medrol BID, if stable/improved by AM -> Change to PO steroids in AM


-  inhaled bronchodilators standing and PRN


-  monitor daily peak flow


-  O2 as needed to keep SpO2 >90%


-  rate control


-  continue anticoagulation








Dr Nguyen

## 2017-04-17 NOTE — PN
Progress Note, Physician


History of Present Illness: 


coughing..better





- Current Medication List


Current Medications: 


Active Medications





Albuterol/Ipratropium (Duoneb -)  1 amp NEB Q4H PRN


   Last Admin: 04/17/17 06:14 Dose:  1 amp


Budesonide/Formoterol Fumarate (Symbicort 160/4.5mcg -)  1 puff IH DAILY [facility]


   Last Admin: 04/17/17 09:19 Dose:  1 puff


Citalopram Hydrobromide (Celexa -)  10 mg PO DAILY [facility]


   Last Admin: 04/17/17 09:19 Dose:  10 mg


Clonazepam (Klonopin -)  0.5 mg PO HS [facility]


   Last Admin: 04/16/17 21:54 Dose:  0.5 mg


Guaifenesin (Diabetic Tussin Dm -)  10 ml PO Q4H PRN


   PRN Reason: COUGH


   Last Admin: 04/17/17 12:04 Dose:  10 ml


Ceftriaxone Sodium (Rocephin 1gm Ivpb (Pre-Docked))  50 mls @ 100 mls/hr IVPB 

DAILY [facility]


   Last Admin: 04/17/17 09:14 Dose:  100 mls/hr


Methylprednisolone Sodium Succinate (Solu-Medrol -)  40 mg IVPB BID [facility]


   Last Admin: 04/17/17 11:47 Dose:  40 mg


Montelukast Sodium (Singulair -)  10 mg PO HS [facility]


   Last Admin: 04/16/17 21:54 Dose:  10 mg


Nystatin (Mycostatin Cream -)  1 applic TP BID [facility]


   Last Admin: 04/17/17 09:15 Dose:  1 applic


Rivaroxaban (Xarelto -)  20 mg PO DAILY [facility]


   Last Admin: 04/17/17 09:14 Dose:  20 mg


Spironolactone (Aldactone -)  25 mg PO DAILY [facility]


   Last Admin: 04/17/17 09:14 Dose:  25 mg


Verapamil HCl (Calan Sr -)  240 mg PO BID [facility]


   Last Admin: 04/17/17 09:14 Dose:  240 mg











- Objective


Vital Signs: 


 Vital Signs











Temperature  98.4 F   04/17/17 08:48


 


Pulse Rate  99 H  04/17/17 08:48


 


Respiratory Rate  18   04/17/17 08:48


 


Blood Pressure  113/70   04/17/17 08:48


 


O2 Sat by Pulse Oximetry (%)  97   04/17/17 09:00











Constitutional: Yes: No Distress


HENT: Yes: Atraumatic


Neck: Yes: Supple


Cardiovascular: Yes: Regular Rate and Rhythm


Respiratory: Yes: Wheezes


Gastrointestinal: Yes: Normal Bowel Sounds


Extremities: Yes: WNL


Neurological: Yes: Alert, Oriented


Labs: 


 CBC, BMP





 04/15/17 06:00 





 04/15/17 08:00 











Problem List





- Problems


(1) Acute asthma exacerbation


Assessment/Plan: 


duo nebs


iv steroids


Code(s): J45.901 - UNSPECIFIED ASTHMA WITH (ACUTE) EXACERBATION   





(2) CAD (coronary artery disease)


Assessment/Plan: 


stable


on meds


Code(s): I25.10 - ATHSCL HEART DISEASE OF NATIVE CORONARY ARTERY W/O ANG PCTRS 

  





(3) Chest pain


Assessment/Plan: 


resolved


atypical


Code(s): R07.9 - CHEST PAIN, UNSPECIFIED   





(4) CHF exacerbation


Assessment/Plan: 


diuretics


stable


Code(s): I50.9 - HEART FAILURE, UNSPECIFIED   Qualifiers: 


     Congestive heart failure type: unspecified congestive heart failure type  

   Qualified Code(s): I50.9 - Heart failure, unspecified  





(5) HLD (hyperlipidemia)


Assessment/Plan: 


on meds stable


Code(s): E78.5 - HYPERLIPIDEMIA, UNSPECIFIED   





(6) HTN (hypertension)


Assessment/Plan: 


on meds stable


Code(s): I10 - ESSENTIAL (PRIMARY) HYPERTENSION   





(7) Bronchitis


Code(s): J40 - BRONCHITIS, NOT SPECIFIED AS ACUTE OR CHRONIC








Assessment/Plan


Assessment/Plan


61 year old woman with a history of chronic asthma, chronic diastolic CHF, 

paroxysmal atrial fibrillation, HTN, HLD, non-obstructive CAD on cardiac cath 1/ 2014, h/o of an episode of possible myocarditis, admitted with sob.





Acute Asthma Exacerbation


Atrial Fibrillation


CAD


LV Diastolic Dysfunction


HTN


Hyperlipidemia


CHF





PLAN


continue duo nebs


iv steroids..taper


home meds


diuretics


on iv abx


pt clinically betterr


will switch to po prednisone tomorrow











-

## 2017-04-17 NOTE — PN
Progress Note, Physician


History of Present Illness: 


seen and examined today in nad. feeling better. sob improving. no overnight 

events. no new complaints.








- Current Medication List


Current Medications: 


Active Medications





Albuterol/Ipratropium (Duoneb -)  1 amp NEB Q4H PRN


   Last Admin: 04/17/17 06:14 Dose:  1 amp


Budesonide/Formoterol Fumarate (Symbicort 160/4.5mcg -)  1 puff IH DAILY [facility]


   Last Admin: 04/17/17 09:19 Dose:  1 puff


Citalopram Hydrobromide (Celexa -)  10 mg PO DAILY [facility]


   Last Admin: 04/17/17 09:19 Dose:  10 mg


Clonazepam (Klonopin -)  0.5 mg PO HS [facility]


   Last Admin: 04/16/17 21:54 Dose:  0.5 mg


Guaifenesin (Diabetic Tussin Dm -)  10 ml PO Q4H PRN


   PRN Reason: COUGH


   Last Admin: 04/17/17 06:30 Dose:  10 ml


Ceftriaxone Sodium (Rocephin 1gm Ivpb (Pre-Docked))  50 mls @ 100 mls/hr IVPB 

DAILY [facility]


   Last Admin: 04/17/17 09:14 Dose:  100 mls/hr


Methylprednisolone Sodium Succinate (Solu-Medrol -)  40 mg IVPB BID [facility]


   Last Admin: 04/16/17 21:54 Dose:  40 mg


Montelukast Sodium (Singulair -)  10 mg PO HS [facility]


   Last Admin: 04/16/17 21:54 Dose:  10 mg


Nystatin (Mycostatin Cream -)  1 applic TP BID [facility]


   Last Admin: 04/17/17 09:15 Dose:  1 applic


Rivaroxaban (Xarelto -)  20 mg PO DAILY [facility]


   Last Admin: 04/17/17 09:14 Dose:  20 mg


Spironolactone (Aldactone -)  25 mg PO DAILY [facility]


   Last Admin: 04/17/17 09:14 Dose:  25 mg


Verapamil HCl (Calan Sr -)  240 mg PO BID [facility]


   Last Admin: 04/17/17 09:14 Dose:  240 mg











- Objective


Vital Signs: 


 Vital Signs











Temperature  98.4 F   04/17/17 08:48


 


Pulse Rate  99 H  04/17/17 08:48


 


Respiratory Rate  18   04/17/17 08:48


 


Blood Pressure  113/70   04/17/17 08:48


 


O2 Sat by Pulse Oximetry (%)  98   04/16/17 21:00











Constitutional: Yes: No Distress, Calm, Obese


Eyes: Yes: Conjunctiva Clear, EOM Intact, PERRL


HENT: Yes: Atraumatic, Normocephalic


Neck: Yes: Supple, Trachea Midline


Cardiovascular: Yes: Pulse Irregular, S1, S2.  No: Regular Rate and Rhythm, 

Bradycardia, Tachycardia, Bruit, JVD, Gallop, Murmur, Rub, S3, S4, Varicosities


Respiratory: Yes: Regular, CTA Bilaterally.  No: Rales, Rhonchi, Wheezes


Gastrointestinal: Yes: Normal Bowel Sounds, Soft.  No: Distention, Tenderness


Musculoskeletal: Yes: WNL


Extremities: Yes: WNL


Edema: No


Peripheral Pulses WNL: Yes


Peripheral Pulses: Left Doralis Pedis: 2+, Right Dorsalis Pedis: 2+


Integumentary: Yes: WNL


Neurological: Yes: Alert, Oriented


Psychiatric: Yes: Alert, Oriented


Labs: 


 CBC, BMP





 04/15/17 06:00 





 04/15/17 08:00 











- ....Imaging


Chest X-ray: Report Reviewed, Image Reviewed


EKG: Report Reviewed, Image Reviewed


Other: Report Reviewed, Image Reviewed





Assessment/Plan


61 year old woman with a history of chronic asthma, chronic diastolic CHF, 

paroxysmal atrial fibrillation, HTN, HLD, non-obstructive CAD on cardiac cath 1/ 2014, h/o of an episode of possible myocarditis, admitted with sob.





SOB-asthma exacerbation


-improving, pulm f/up appreciated


-flu swab negative


-no sign of decompensated CHF


-does not require diuresis


-echo can be done as outpatient





Atrial fibrillation-paroxysmal, HR currently well controlled, brief episodes of 

RVR yesterday


-cont Verapamil ER 240mg bid


-cont Xarelto


-ok from a cardiac standpoint for discharge home


-f/up as outpatient with myself or Dr. Farfan, [facility] outpatient holter vs event 

monitor





CAD-non-obstructive as above, stable


-not currently on ASA, would resume if no contraindication


-did not tolerate bblocker in the past


-outpatient f/up





HTN-adequately controlled


-cont current medical regimen

## 2017-04-18 RX ADMIN — VERAPAMIL HYDROCHLORIDE SCH MG: 240 TABLET, FILM COATED, EXTENDED RELEASE ORAL at 09:46

## 2017-04-18 RX ADMIN — PREDNISONE SCH MG: 20 TABLET ORAL at 18:45

## 2017-04-18 RX ADMIN — GUAIFENESIN AND DEXTROMETHORPHAN HYDROBROMIDE PRN ML: 100; 10 SOLUTION ORAL at 09:47

## 2017-04-18 RX ADMIN — GUAIFENESIN AND DEXTROMETHORPHAN HYDROBROMIDE PRN ML: 100; 10 SOLUTION ORAL at 14:47

## 2017-04-18 RX ADMIN — CEFTRIAXONE SCH MLS/HR: 1 INJECTION, POWDER, FOR SOLUTION INTRAMUSCULAR; INTRAVENOUS at 09:45

## 2017-04-18 RX ADMIN — MONTELUKAST SODIUM SCH MG: 10 TABLET, COATED ORAL at 21:49

## 2017-04-18 RX ADMIN — BUDESONIDE AND FORMOTEROL FUMARATE DIHYDRATE SCH PUFF: 160; 4.5 AEROSOL RESPIRATORY (INHALATION) at 09:46

## 2017-04-18 RX ADMIN — VERAPAMIL HYDROCHLORIDE SCH MG: 240 TABLET, FILM COATED, EXTENDED RELEASE ORAL at 21:49

## 2017-04-18 RX ADMIN — METHYLPREDNISOLONE SODIUM SUCCINATE SCH MG: 40 INJECTION, POWDER, FOR SOLUTION INTRAMUSCULAR; INTRAVENOUS at 09:46

## 2017-04-18 RX ADMIN — RIVAROXABAN SCH MG: 20 TABLET, FILM COATED ORAL at 09:46

## 2017-04-18 RX ADMIN — IPRATROPIUM BROMIDE AND ALBUTEROL SULFATE PRN AMP: .5; 3 SOLUTION RESPIRATORY (INHALATION) at 06:30

## 2017-04-18 RX ADMIN — SPIRONOLACTONE SCH MG: 25 TABLET, FILM COATED ORAL at 09:46

## 2017-04-18 RX ADMIN — CITALOPRAM HYDROBROMIDE SCH MG: 10 TABLET ORAL at 09:46

## 2017-04-18 RX ADMIN — NYSTATIN CREAM SCH APPLIC: 100000 CREAM TOPICAL at 22:00

## 2017-04-18 RX ADMIN — NYSTATIN CREAM SCH APPLIC: 100000 CREAM TOPICAL at 09:46

## 2017-04-18 NOTE — PN
Progress Note, Physician


History of Present Illness: 


coughing..better





- Current Medication List


Current Medications: 


Active Medications





Albuterol/Ipratropium (Duoneb -)  1 amp NEB Q4H PRN


   Last Admin: 04/18/17 06:30 Dose:  1 amp


Budesonide/Formoterol Fumarate (Symbicort 160/4.5mcg -)  1 puff IH DAILY [facility]


   Last Admin: 04/18/17 09:46 Dose:  1 puff


Citalopram Hydrobromide (Celexa -)  10 mg PO DAILY [facility]


   Last Admin: 04/18/17 09:46 Dose:  10 mg


Clonazepam (Klonopin -)  0.5 mg PO HS [facility]


   Last Admin: 04/17/17 22:03 Dose:  0.5 mg


Guaifenesin (Diabetic Tussin Dm -)  10 ml PO Q4H PRN


   PRN Reason: COUGH


   Last Admin: 04/18/17 14:47 Dose:  10 ml


Ceftriaxone Sodium (Rocephin 1gm Ivpb (Pre-Docked))  50 mls @ 100 mls/hr IVPB 

DAILY [facility]


   Last Admin: 04/18/17 09:45 Dose:  100 mls/hr


Montelukast Sodium (Singulair -)  10 mg PO HS [facility]


   Last Admin: 04/17/17 22:03 Dose:  10 mg


Nystatin (Mycostatin Cream -)  1 applic TP BID [facility]


   Last Admin: 04/18/17 09:46 Dose:  1 applic


Prednisone (Deltasone -)  40 mg PO DAILY [facility]


Rivaroxaban (Xarelto -)  20 mg PO DAILY [facility]


   Last Admin: 04/18/17 09:46 Dose:  20 mg


Spironolactone (Aldactone -)  25 mg PO DAILY [facility]


   Last Admin: 04/18/17 09:46 Dose:  25 mg


Verapamil HCl (Calan Sr -)  240 mg PO BID [facility]


   Last Admin: 04/18/17 09:46 Dose:  240 mg











- Objective


Vital Signs: 


 Vital Signs











Temperature  98 F   04/18/17 18:35


 


Pulse Rate  117 H  04/18/17 18:35


 


Respiratory Rate  20   04/18/17 18:35


 


Blood Pressure  117/74   04/18/17 18:35


 


O2 Sat by Pulse Oximetry (%)  98   04/18/17 09:00











Constitutional: Yes: No Distress


HENT: Yes: Atraumatic


Neck: Yes: Supple


Cardiovascular: Yes: Regular Rate and Rhythm


Respiratory: Yes: Rhonchi, Wheezes (much improved)


Gastrointestinal: Yes: Normal Bowel Sounds


Extremities: Yes: WNL


Neurological: Yes: Alert, Oriented


Labs: 


 CBC, BMP





 04/15/17 06:00 





 04/15/17 08:00 











Problem List





- Problems


(1) Acute asthma exacerbation


Assessment/Plan: 


duo nebs


on po from today


Code(s): J45.901 - UNSPECIFIED ASTHMA WITH (ACUTE) EXACERBATION   





(2) CAD (coronary artery disease)


Assessment/Plan: 


stable


on meds


Code(s): I25.10 - ATHSCL HEART DISEASE OF NATIVE CORONARY ARTERY W/O ANG PCTRS 

  





(3) Chest pain


Assessment/Plan: 


resolved


atypical


Code(s): R07.9 - CHEST PAIN, UNSPECIFIED   





(4) CHF exacerbation


Assessment/Plan: 


diuretics


stable


Code(s): I50.9 - HEART FAILURE, UNSPECIFIED   Qualifiers: 


     Congestive heart failure type: unspecified congestive heart failure type  

   Qualified Code(s): I50.9 - Heart failure, unspecified  





(5) HLD (hyperlipidemia)


Assessment/Plan: 


on meds stable


Code(s): E78.5 - HYPERLIPIDEMIA, UNSPECIFIED   





(6) HTN (hypertension)


Assessment/Plan: 


on meds stable


Code(s): I10 - ESSENTIAL (PRIMARY) HYPERTENSION   





(7) Bronchitis


Code(s): J40 - BRONCHITIS, NOT SPECIFIED AS ACUTE OR CHRONIC








Assessment/Plan


DC IN AM IF STABLE


PRESCRIPTIONS SENT TO PHARMACY


PATIENT WALKING IN THE HALLWAY WITHOUT OXYGEN

## 2017-04-18 NOTE — PN
Progress Note (short form)





- Note


Progress Note: 


Breathing feels slightly better today.  Meigs MCKNIGHT when she ambulated with her 

son yesterday. 


Still with congested cough and intermittent wheezing. 


Some rib discomfort due to cough. 





 Intake & Output











 04/15/17 04/16/17 04/17/17 04/18/17





 23:59 23:59 23:59 23:59


 


Intake Total 1200 540 220 170


 


Balance 1200 540 220 170








 Last Vital Signs











Temp Pulse Resp BP Pulse Ox


 


 98.6 F   102 H  20   112/63   97 


 


 04/18/17 06:00  04/18/17 06:00  04/18/17 06:00  04/18/17 06:00  04/17/17 09:00








Active Medications





Albuterol/Ipratropium (Duoneb -)  1 amp NEB Q4H PRN


   Last Admin: 04/18/17 06:30 Dose:  1 amp


Budesonide/Formoterol Fumarate (Symbicort 160/4.5mcg -)  1 puff IH DAILY [facility]


   Last Admin: 04/17/17 09:19 Dose:  1 puff


Citalopram Hydrobromide (Celexa -)  10 mg PO DAILY [facility]


   Last Admin: 04/17/17 09:19 Dose:  10 mg


Clonazepam (Klonopin -)  0.5 mg PO HS [facility]


   Last Admin: 04/17/17 22:03 Dose:  0.5 mg


Guaifenesin (Diabetic Tussin Dm -)  10 ml PO Q4H PRN


   PRN Reason: COUGH


   Last Admin: 04/17/17 17:34 Dose:  10 ml


Ceftriaxone Sodium (Rocephin 1gm Ivpb (Pre-Docked))  50 mls @ 100 mls/hr IVPB 

DAILY [facility]


   Last Admin: 04/17/17 09:14 Dose:  100 mls/hr


Methylprednisolone Sodium Succinate (Solu-Medrol -)  40 mg IVPB BID [facility]


   Last Admin: 04/17/17 22:04 Dose:  40 mg


Montelukast Sodium (Singulair -)  10 mg PO HS [facility]


   Last Admin: 04/17/17 22:03 Dose:  10 mg


Nystatin (Mycostatin Cream -)  1 applic TP BID [facility]


   Last Admin: 04/17/17 22:30 Dose:  1 applic


Rivaroxaban (Xarelto -)  20 mg PO DAILY [facility]


   Last Admin: 04/17/17 09:14 Dose:  20 mg


Spironolactone (Aldactone -)  25 mg PO DAILY [facility]


   Last Admin: 04/17/17 09:14 Dose:  25 mg


Verapamil HCl (Calan Sr -)  240 mg PO BID [facility]


   Last Admin: 04/17/17 22:04 Dose:  240 mg








Gen: NAD at rest, (+) cough 


Heart:  RRR


Lung: less rhonchi, wheezes


Abd: soft, nontender


Ext: no edema





 








Problem List





- Problems


(1) Acute asthma exacerbation


Code(s): J45.901 - UNSPECIFIED ASTHMA WITH (ACUTE) EXACERBATION   





(2) Atrial fibrillation


Code(s): I48.91 - UNSPECIFIED ATRIAL FIBRILLATION   





(3) CAD (coronary artery disease)


Code(s): I25.10 - ATHSCL HEART DISEASE OF NATIVE CORONARY ARTERY W/O ANG PCTRS 

  





(4) HLD (hyperlipidemia)


Code(s): E78.5 - HYPERLIPIDEMIA, UNSPECIFIED   





(5) HTN (hypertension)


Code(s): I10 - ESSENTIAL (PRIMARY) HYPERTENSION   








A/P


Acute Asthma Exacerbation


Atrial Fibrillation


CAD


LV Diastolic Dysfunction


HTN


Hyperlipidemia








-  Ambulate as tolerated 


-  Would continue medrol BID, if stable/improved by AM -> Change to PO steroids


-  inhaled bronchodilators standing and PRN


-  monitor daily peak flow


-  O2 as needed to keep SpO2 >90%


-  rate control


-  continue anticoagulation








Dr Nguyen

## 2017-04-18 NOTE — PN
Progress Note, Physician


History of Present Illness: 


according to daughter and patient


she is feeling better today


cough decreasing


sputum still produced





- Current Medication List


Current Medications: 


Active Medications





Albuterol/Ipratropium (Duoneb -)  1 amp NEB Q4H PRN


   Last Admin: 04/18/17 06:30 Dose:  1 amp


Budesonide/Formoterol Fumarate (Symbicort 160/4.5mcg -)  1 puff IH DAILY [facility]


   Last Admin: 04/18/17 09:46 Dose:  1 puff


Citalopram Hydrobromide (Celexa -)  10 mg PO DAILY [facility]


   Last Admin: 04/18/17 09:46 Dose:  10 mg


Clonazepam (Klonopin -)  0.5 mg PO HS [facility]


   Last Admin: 04/17/17 22:03 Dose:  0.5 mg


Guaifenesin (Diabetic Tussin Dm -)  10 ml PO Q4H PRN


   PRN Reason: COUGH


   Last Admin: 04/18/17 09:47 Dose:  10 ml


Ceftriaxone Sodium (Rocephin 1gm Ivpb (Pre-Docked))  50 mls @ 100 mls/hr IVPB 

DAILY [facility]


   Last Admin: 04/18/17 09:45 Dose:  100 mls/hr


Methylprednisolone Sodium Succinate (Solu-Medrol -)  40 mg IVPB BID [facility]


   Last Admin: 04/18/17 09:46 Dose:  40 mg


Montelukast Sodium (Singulair -)  10 mg PO HS [facility]


   Last Admin: 04/17/17 22:03 Dose:  10 mg


Nystatin (Mycostatin Cream -)  1 applic TP BID [facility]


   Last Admin: 04/18/17 09:46 Dose:  1 applic


Rivaroxaban (Xarelto -)  20 mg PO DAILY [facility]


   Last Admin: 04/18/17 09:46 Dose:  20 mg


Spironolactone (Aldactone -)  25 mg PO DAILY [facility]


   Last Admin: 04/18/17 09:46 Dose:  25 mg


Verapamil HCl (Calan Sr -)  240 mg PO BID [facility]


   Last Admin: 04/18/17 09:46 Dose:  240 mg











- Objective


Vital Signs: 


 Vital Signs











Temperature  98.1 F   04/18/17 10:00


 


Pulse Rate  95 H  04/18/17 10:00


 


Respiratory Rate  20   04/18/17 10:00


 


Blood Pressure  113/70   04/18/17 10:00


 


O2 Sat by Pulse Oximetry (%)  98   04/18/17 09:00











Constitutional: Yes: No Distress, Calm


Cardiovascular: Yes: Pulse Irregular, S1, S2


Respiratory: Yes: Regular, Wheezes


Gastrointestinal: Yes: Normal Bowel Sounds, Soft


Musculoskeletal: Yes: WNL


Extremities: Yes: WNL


Neurological: Yes: Alert, Oriented


Psychiatric: Yes: Alert


Labs: 


 CBC, BMP





 04/15/17 06:00 





 04/15/17 08:00 











Assessment/Plan


- Problems


(1) Acute asthma exacerbation


Code(s): J45.901 - UNSPECIFIED ASTHMA WITH (ACUTE) EXACERBATION   





(2) Atrial fibrillation


Code(s): I48.91 - UNSPECIFIED ATRIAL FIBRILLATION   





(3) CAD (coronary artery disease)


Code(s): I25.10 - ATHSCL HEART DISEASE OF NATIVE CORONARY ARTERY W/O ANG PCTRS 

  





(4) HLD (hyperlipidemia)


Code(s): E78.5 - HYPERLIPIDEMIA, UNSPECIFIED   





(5) HTN (hypertension)


Code(s): I10 - ESSENTIAL (PRIMARY) HYPERTENSION   








a/p


afib


asthma exacerebration


sob


htn





at this time i do not see signs of infection


will not start any abx





plan


continue current mgmt


xray noted


incentive marquez


resp physio

## 2017-04-19 VITALS — TEMPERATURE: 99 F | SYSTOLIC BLOOD PRESSURE: 119 MMHG | HEART RATE: 83 BPM | DIASTOLIC BLOOD PRESSURE: 69 MMHG

## 2017-04-19 LAB
DEPRECATED RDW RBC AUTO: 16.5 % (ref 11.6–15.6)
MCH RBC QN AUTO: 27.7 PG (ref 25.7–33.7)
MCHC RBC AUTO-ENTMCNC: 33.4 G/DL (ref 32–36)
MCV RBC: 82.8 FL (ref 80–96)
METAMYELOCYTES NFR BLD: 1 % (ref 0–2)
NEUTROPHILS # BLD: 90 % (ref 42.8–82.8)
PLATELET # BLD AUTO: 218 K/MM3 (ref 134–434)
PLATELET # BLD EST: ADEQUATE 10*3/UL
PMV BLD: 8.1 FL (ref 7.5–11.1)
WBC # BLD AUTO: 13.7 K/MM3 (ref 4–10)

## 2017-04-19 RX ADMIN — CITALOPRAM HYDROBROMIDE SCH MG: 10 TABLET ORAL at 09:17

## 2017-04-19 RX ADMIN — SPIRONOLACTONE SCH MG: 25 TABLET, FILM COATED ORAL at 09:16

## 2017-04-19 RX ADMIN — PREDNISONE SCH MG: 20 TABLET ORAL at 09:16

## 2017-04-19 RX ADMIN — RIVAROXABAN SCH MG: 20 TABLET, FILM COATED ORAL at 09:16

## 2017-04-19 RX ADMIN — CEFTRIAXONE SCH MLS/HR: 1 INJECTION, POWDER, FOR SOLUTION INTRAMUSCULAR; INTRAVENOUS at 09:16

## 2017-04-19 RX ADMIN — IPRATROPIUM BROMIDE AND ALBUTEROL SULFATE PRN AMP: .5; 3 SOLUTION RESPIRATORY (INHALATION) at 11:17

## 2017-04-19 RX ADMIN — IPRATROPIUM BROMIDE AND ALBUTEROL SULFATE PRN AMP: .5; 3 SOLUTION RESPIRATORY (INHALATION) at 14:04

## 2017-04-19 RX ADMIN — NYSTATIN CREAM SCH APPLIC: 100000 CREAM TOPICAL at 09:18

## 2017-04-19 RX ADMIN — VERAPAMIL HYDROCHLORIDE SCH MG: 240 TABLET, FILM COATED, EXTENDED RELEASE ORAL at 09:16

## 2017-04-19 RX ADMIN — BUDESONIDE AND FORMOTEROL FUMARATE DIHYDRATE SCH PUFF: 160; 4.5 AEROSOL RESPIRATORY (INHALATION) at 09:17

## 2017-04-19 NOTE — DS
Physical Examination


Vital Signs: 


 Vital Signs











Temperature  98.7 F   04/19/17 15:27


 


Pulse Rate  90   04/19/17 15:27


 


Respiratory Rate  20   04/19/17 15:27


 


Blood Pressure  108/65   04/19/17 15:27


 


O2 Sat by Pulse Oximetry (%)  97   04/19/17 11:00











Constitutional: Yes: No Distress


HENT: Yes: Atraumatic


Neck: Yes: Supple


Cardiovascular: Yes: Regular Rate and Rhythm


Respiratory: Yes: Rhonchi


Gastrointestinal: Yes: Normal Bowel Sounds


Extremities: Yes: WNL


Neurological: Yes: Alert, Oriented


Labs: 


 CBC, BMP





 04/19/17 06:30 





 04/15/17 08:00 











Discharge Summary


Reason For Visit: SOB, ATRIAL FIB


Current Active Problems





Acute asthma exacerbation (Acute) 


Asthma (Acute) 


Atrial fibrillation (Acute) 


Bronchitis (Acute) 


CAD (coronary artery disease) (Acute) 


Chest pain (Acute) 


Cough (Acute) 


Epigastric abdominal pain (Acute) 


GIB (gastrointestinal bleeding) (Acute) 


SOB (shortness of breath) (Acute) 








Condition: Guarded





- Instructions


Diet, Activity, Other Instructions: 


Activity as tolerated


Low fat low cholestrol diet


May take shower


Referrals: 


Milady Cline [Primary Care Provider] - 


Disposition: HOME





- Home Medications


Comprehensive Discharge Medication List: 


Ambulatory Orders





Citalopram Hydrobromide [Celexa -] 10 mg PO DAILY 03/06/17 


Clonazepam [Klonopin -] 0.5 mg PO DAILY MDD 2 03/06/17 


Spironolactone [Aldactone -] 25 mg PO DAILY 03/06/17 


Verapamil HCl [Verapamil ER Pm] 300 mg PO DAILY 03/06/17 


Rivaroxaban [Xarelto] 1 each PO DAILY 04/07/17 


Albuterol 0.083% Nebulizer Sol [Ventolin 0.083% Nebulizer Soln -] 1 neb NEB Q6H 

#40 vial 04/08/17 


Albuterol Sulfate [Proair Respiclick] 90 mcg IH Q4H 04/08/17 


Budesonide/Formeterol Fumarate [SYMBICORT 160/4.5mcg -] 1 puff IH DAILY 04/08/ 17 


Prednisone [Deltasone -] 40 mg PO UTDICT #10 tablet 04/08/17 


Prednisone 10 mg PO DAILY #30 tab.ds.pk 04/18/17 





dc home


send cough syrup prescription

## 2017-04-19 NOTE — PN
Progress Note, Physician


History of Present Illness: 


continues to cough


mostly at night


still with sputum production





- Current Medication List


Current Medications: 


Active Medications





Albuterol/Ipratropium (Duoneb -)  1 amp NEB Q4H PRN


   Last Admin: 04/18/17 06:30 Dose:  1 amp


Budesonide/Formoterol Fumarate (Symbicort 160/4.5mcg -)  1 puff IH DAILY [facility]


   Last Admin: 04/19/17 09:17 Dose:  1 puff


Citalopram Hydrobromide (Celexa -)  10 mg PO DAILY [facility]


   Last Admin: 04/19/17 09:17 Dose:  10 mg


Clonazepam (Klonopin -)  0.5 mg PO HS [facility]


   Last Admin: 04/18/17 21:49 Dose:  0.5 mg


Guaifenesin (Diabetic Tussin Dm -)  10 ml PO Q4H PRN


   PRN Reason: COUGH


   Last Admin: 04/18/17 14:47 Dose:  10 ml


Ceftriaxone Sodium (Rocephin 1gm Ivpb (Pre-Docked))  50 mls @ 100 mls/hr IVPB 

DAILY [facility]


   Last Admin: 04/19/17 09:16 Dose:  100 mls/hr


Montelukast Sodium (Singulair -)  10 mg PO HS [facility]


   Last Admin: 04/18/17 21:49 Dose:  10 mg


Nystatin (Mycostatin Cream -)  1 applic TP BID [facility]


   Last Admin: 04/19/17 09:18 Dose:  1 applic


Prednisone (Deltasone -)  40 mg PO DAILY [facility]


   Last Admin: 04/19/17 09:16 Dose:  40 mg


Rivaroxaban (Xarelto -)  20 mg PO DAILY [facility]


   Last Admin: 04/19/17 09:16 Dose:  20 mg


Spironolactone (Aldactone -)  25 mg PO DAILY [facility]


   Last Admin: 04/19/17 09:16 Dose:  25 mg


Verapamil HCl (Calan Sr -)  240 mg PO BID [facility]


   Last Admin: 04/19/17 09:16 Dose:  240 mg











- Objective


Vital Signs: 


 Vital Signs











Temperature  97.8 F   04/19/17 06:00


 


Pulse Rate  102 H  04/19/17 06:00


 


Respiratory Rate  22   04/19/17 06:00


 


Blood Pressure  105/57   04/19/17 06:00


 


O2 Sat by Pulse Oximetry (%)  99   04/18/17 20:56











Constitutional: Yes: No Distress, Calm, Obese


Cardiovascular: Yes: S1, S2


Respiratory: Yes: Regular, Wheezes


Gastrointestinal: Yes: Normal Bowel Sounds, Soft


Musculoskeletal: Yes: WNL


Extremities: Yes: WNL


Neurological: Yes: Alert, Oriented


Psychiatric: Yes: Alert, Oriented


Labs: 


 CBC, BMP





 04/19/17 06:30 





 04/15/17 08:00 











Assessment/Plan


- Problems


(1) Acute asthma exacerbation


Code(s): J45.901 - UNSPECIFIED ASTHMA WITH (ACUTE) EXACERBATION   





(2) Atrial fibrillation


Code(s): I48.91 - UNSPECIFIED ATRIAL FIBRILLATION   





(3) CAD (coronary artery disease)


Code(s): I25.10 - ATHSCL HEART DISEASE OF NATIVE CORONARY ARTERY W/O ANG PCTRS 

  





(4) HLD (hyperlipidemia)


Code(s): E78.5 - HYPERLIPIDEMIA, UNSPECIFIED   





(5) HTN (hypertension)


Code(s): I10 - ESSENTIAL (PRIMARY) HYPERTENSION   








a/p


afib


asthma exacerebration


sob


htn











plan


continue current mgmt


xray noted


incentive marquez


resp physio

## 2017-04-19 NOTE — PN
Progress Note (short form)





- Note


Progress Note: 


PULMONARY





VSS/AFEBRILE/CONGESTED COUGH





ANICTERIC


DIFFUSE B/L WHEEZE 


S1S2


BS+ OBESE


NO EDEMA





LABS/MEDS/NOTES/IMAGING/MICRO REVIEWED





Acute Asthma Exacerbation/Slow improvement


Atrial Fibrillation


CAD


LV Diastolic Dysfunction


HTN


Hyperlipidemia





-  Oral prednisone


-  inhaled bronchodilators standing and PRN


-  monitor daily peak flow


-  O2 as needed to keep SpO2 >90%


-  rate control


-  anticoagulation





MARIA ALEJANDRA SWAN MD

## 2017-05-04 ENCOUNTER — APPOINTMENT (OUTPATIENT)
Dept: PULMONOLOGY | Facility: CLINIC | Age: 62
End: 2017-05-04

## 2018-03-15 ENCOUNTER — HOSPITAL ENCOUNTER (EMERGENCY)
Dept: HOSPITAL 74 - JER | Age: 63
LOS: 1 days | Discharge: HOME | End: 2018-03-16
Payer: COMMERCIAL

## 2018-03-15 VITALS — BODY MASS INDEX: 38.6 KG/M2

## 2018-03-15 VITALS — HEART RATE: 82 BPM | SYSTOLIC BLOOD PRESSURE: 135 MMHG | DIASTOLIC BLOOD PRESSURE: 81 MMHG | TEMPERATURE: 97.9 F

## 2018-03-15 DIAGNOSIS — J45.909: ICD-10-CM

## 2018-03-15 DIAGNOSIS — Z79.01: ICD-10-CM

## 2018-03-15 DIAGNOSIS — E03.9: ICD-10-CM

## 2018-03-15 DIAGNOSIS — I48.91: ICD-10-CM

## 2018-03-15 DIAGNOSIS — Z87.442: ICD-10-CM

## 2018-03-15 DIAGNOSIS — F32.9: ICD-10-CM

## 2018-03-15 DIAGNOSIS — Z87.11: ICD-10-CM

## 2018-03-15 DIAGNOSIS — I10: ICD-10-CM

## 2018-03-15 DIAGNOSIS — E78.00: ICD-10-CM

## 2018-03-15 DIAGNOSIS — R10.84: Primary | ICD-10-CM

## 2018-03-15 LAB
ALBUMIN SERPL-MCNC: 3.9 G/DL (ref 3.4–5)
ALP SERPL-CCNC: 107 U/L (ref 45–117)
ALT SERPL-CCNC: 15 U/L (ref 12–78)
ANION GAP SERPL CALC-SCNC: 13 MMOL/L (ref 8–16)
APPEARANCE UR: CLEAR
AST SERPL-CCNC: 15 U/L (ref 15–37)
BASOPHILS # BLD: 0.2 % (ref 0–2)
BILIRUB SERPL-MCNC: 0.5 MG/DL (ref 0.2–1)
BILIRUB UR STRIP.AUTO-MCNC: NEGATIVE MG/DL
BUN SERPL-MCNC: 14 MG/DL (ref 7–18)
CALCIUM SERPL-MCNC: 9 MG/DL (ref 8.5–10.1)
CHLORIDE SERPL-SCNC: 95 MMOL/L (ref 98–107)
CO2 SERPL-SCNC: 26 MMOL/L (ref 21–32)
COLOR UR: (no result)
CREAT SERPL-MCNC: 1 MG/DL (ref 0.55–1.02)
DEPRECATED RDW RBC AUTO: 17.1 % (ref 11.6–15.6)
EOSINOPHIL # BLD: 0.2 % (ref 0–4.5)
GLUCOSE SERPL-MCNC: 71 MG/DL (ref 74–106)
HCT VFR BLD CALC: 37.7 % (ref 32.4–45.2)
HGB BLD-MCNC: 12.5 GM/DL (ref 10.7–15.3)
INR BLD: 1.43 (ref 0.82–1.09)
KETONES UR QL STRIP: NEGATIVE
LEUKOCYTE ESTERASE UR QL STRIP.AUTO: NEGATIVE
LYMPHOCYTES # BLD: 13.8 % (ref 8–40)
MCH RBC QN AUTO: 25.7 PG (ref 25.7–33.7)
MCHC RBC AUTO-ENTMCNC: 33 G/DL (ref 32–36)
MCV RBC: 78 FL (ref 80–96)
MONOCYTES # BLD AUTO: 5 % (ref 3.8–10.2)
NEUTROPHILS # BLD: 80.8 % (ref 42.8–82.8)
NITRITE UR QL STRIP: NEGATIVE
PH UR: 7 [PH] (ref 5–8)
PLATELET # BLD AUTO: 356 K/MM3 (ref 134–434)
PMV BLD: 7.7 FL (ref 7.5–11.1)
POTASSIUM SERPLBLD-SCNC: 4.1 MMOL/L (ref 3.5–5.1)
PROT SERPL-MCNC: 7.7 G/DL (ref 6.4–8.2)
PROT UR QL STRIP: NEGATIVE
PROT UR QL STRIP: NEGATIVE
PT PNL PPP: 16.2 SEC (ref 9.98–11.88)
RBC # BLD AUTO: 4.84 M/MM3 (ref 3.6–5.2)
RBC # UR STRIP: NEGATIVE /UL
SODIUM SERPL-SCNC: 134 MMOL/L (ref 136–145)
SP GR UR: 1 (ref 1–1.03)
UROBILINOGEN UR STRIP-MCNC: NEGATIVE MG/DL (ref 0.2–1)
WBC # BLD AUTO: 14.1 K/MM3 (ref 4–10)

## 2018-03-15 PROCEDURE — 3E033NZ INTRODUCTION OF ANALGESICS, HYPNOTICS, SEDATIVES INTO PERIPHERAL VEIN, PERCUTANEOUS APPROACH: ICD-10-PCS

## 2018-03-15 NOTE — PDOC
History of Present Illness





- General


Chief Complaint: Pain, Acute


Stated Complaint: PAIN/ ABD, RT SIDE


Time Seen by Provider: 03/15/18 15:35


History Source: Patient, Family


Exam Limitations: No Limitations





- History of Present Illness


Initial Comments: 





03/15/18 22:39


Patient is a 62-year-old female with past medical history of CAD - AFib on 

Xeralto asthma, HTN, depression brought by son for complaints of right-sided 

abdominal pain 2 days. States pain started in the back then radiated to the 

front now within the right lower quadrant. States pain is 10/10 continuous, 

sharp with no alleviating or aggravating factors. She has been taking Motrin 

with no relief, last dose was 9 PM yesterday. She denies any nausea, vomiting, 

chills, fever, diarrhea, constipation. No history of kidney stones.





PMD: [facility]


PMHX:  as above


PSOCHX:  neg drug, etoh, cig


ALL:  lasix








GENERAL/CONSTITUTIONAL: [No fever or chills. No weakness. No weight change.]


HEAD, EYES, EARS, NOSE AND THROAT: [No change in vision. No ear pain or 

discharge. No sore throat.]


CARDIOVASCULAR: [No chest pain or shortness of breath.]


RESPIRATORY: [No cough, wheezing, or hemoptysis.]


GASTROINTESTINAL: [No nausea, vomiting, diarrhea or constipation. No rectal 

bleeding.]


GENITOURINARY: [No dysuria, frequency, or change in urination.]


MUSCULOSKELETAL: [No joint or muscle swelling or pain. No neck or back pain.]


SKIN AND BREASTS: [No rash or easy bruising.]


NEUROLOGIC: [No headache, vertigo, loss of consciousness, or loss of sensation.]


PSYCHIATRIC: [No depression or anxiety.]


ENDOCRINE: [No increased thirst. No abnormal weight change.]


HEMATOLOGIC/LYMPHATIC: [No anemia, easy bleeding, or history of blood clots.]


ALLERGIC/IMMUNOLOGIC: [No hives or skin allergy. No latex allergy.]





GENERAL: [The patient is awake, alert, and fully oriented, in moderate painful 

distress.]


HEAD: [Normal with no signs of trauma.]


EYES: [Pupils equal, round and reactive to light, extraocular movements intact, 

sclera anicteric, conjunctiva clear.]


ENT: [Ears normal, nares patent, oropharynx clear without exudates.  Moist 

mucous membranes.]


NECK: [Normal range of motion, supple without lymphadenopathy, JVD, or masses.]


LUNGS: [Breath sounds equal, clear to auscultation bilaterally.  No wheezes, 

and no crackles.]


HEART: [Regular rate and rhythm, normal S1 and S2 without murmur, rub.]


ABDOMEN: [Soft, (+) tenderness in RLQ, normoactive bowel sounds.  No guarding, 

no rebound.  No masses.]


EXTREMITIES: [Normal range of motion, no edema.  No clubbing or cyanosis. No 

cords, erythema, or tenderness.]


NEUROLOGICAL: [Cranial nerves II through XII grossly intact.  Normal speech, 

normal gait.]


PSYCH: [Normal mood, normal affect.]


SKIN: [Warm, Dry, normal turgor, no rashes or lesions noted.]











Past History





- Past Medical History


Allergies/Adverse Reactions: 


 Allergies











Allergy/AdvReac Type Severity Reaction Status Date / Time


 


furosemide [From Lasix] Allergy Intermediate Itching Verified 03/15/18 15:39











Home Medications: 


Ambulatory Orders





Albuterol Sulfate Inhaler - [Ventolin Hfa Inhaler -] 1 puff IH PRN 03/15/18 


Albuterol Sulfate [Proventil HFA Inhaler -] 1 - 2 inh PO PRN 03/15/18 


Citalopram Hydrobromide [Citalopram HBr] 10 mg PO DAILY 03/15/18 


Clonazepam 0.5 mg PO DAILY 03/15/18 


Omeprazole 20 mg PO DAILY 03/15/18 


Rivaroxaban [Xarelto -] 20 mg PO DAILY 03/15/18 


Spironolact/Hydrochlorothiazid [Spironolactone-Hctz 25-25 Tab] 1 tab PO DAILY 03

/15/18 


Verapamil HCl [Verapamil ER] 360 mg PO DAILY 03/15/18 


Ciprofloxacin HCl [Cipro] 500 mg PO BID #20 tablet 03/16/18 


Ibuprofen [Motrin -] 600 mg PO QID #28 tablet 03/16/18 


Metronidazole 500 mg PO TID #30 tablet 03/16/18 


Tramadol HCl [Ultram] 50 mg PO QID #20 tablet MDD 6 03/16/18 








Asthma: Yes


Cancer: No


Cardiac Disorders: Yes (afib)


CHF: Yes


Dementia: No


Diabetes: No


GI Disorders: No (PUD)


 Disorders: No


HTN: Yes


Hypercholesterolemia: Yes


Liver Disease: No


Psychiatric Problems: Yes (depression)


Seizures: No





- Surgical History


Cardiac Surgery: Yes


Cholecystectomy: Yes


Orthopedic Surgery: No





- Suicide/Smoking/Psychosocial Hx


Smoking History: Never smoked


Have you smoked in the past 12 months: No


Number of Cigarettes Smoked Daily: 6


If you are a former smoker, when did you quit?: 1986


Information on smoking cessation initiated: No


'Breaking Loose' booklet given: 11/01/16


Hx Alcohol Use: No


Drug/Substance Use Hx: No


Substance Use Type: None


Hx Substance Use Treatment: No





*Physical Exam





- Vital Signs


 Last Vital Signs











Temp Pulse Resp BP Pulse Ox


 


 97.9 F   82   16   135/81   98 


 


 03/15/18 15:35  03/15/18 15:35  03/15/18 15:35  03/15/18 15:35  03/15/18 15:35














ED Treatment Course





- LABORATORY


CBC & Chemistry Diagram: 


 03/15/18 15:57





 03/15/18 15:57





- ADDITIONAL ORDERS


Additional order review: 


 Laboratory  Results











  03/15/18 03/15/18 03/15/18





  15:57 15:57 15:45


 


PT with INR   16.20 H 


 


INR   1.43 H 


 


Sodium  134 L  


 


Potassium  4.1  


 


Chloride  95 L  


 


Carbon Dioxide  26  


 


Anion Gap  13  


 


BUN  14  


 


Creatinine  1.0  


 


Creat Clearance w eGFR  56.18  


 


Random Glucose  71 L  


 


Calcium  9.0  


 


Total Bilirubin  0.5  D  


 


AST  15  


 


ALT  15  


 


Alkaline Phosphatase  107  


 


Total Protein  7.7  


 


Albumin  3.9  


 


Urine Color    Straw


 


Urine Appearance    Clear


 


Urine pH    7.0


 


Ur Specific Gravity    1.003


 


Urine Protein    Negative


 


Urine Glucose (UA)    Negative


 


Urine Ketones    Negative


 


Urine Blood    Negative


 


Urine Nitrite    Negative


 


Urine Bilirubin    Negative


 


Urine Urobilinogen    Negative


 


Ur Leukocyte Esterase    Negative








 











  03/15/18





  15:57


 


RBC  4.84  D


 


MCV  78.0 L


 


MCHC  33.0


 


RDW  17.1 H


 


MPV  7.7


 


Neutrophils %  80.8


 


Lymphocytes %  13.8  D


 


Monocytes %  5.0  D


 


Eosinophils %  0.2  D


 


Basophils %  0.2














- RADIOLOGY


Radiology Studies Ordered: 














 Category Date Time Status


 


 ABDOMEN & PELVIS CT WITH CONTR [CT] Stat CT Scan  03/15/18 20:21 Taken














- Medications


Given in the ED: 


ED Medications














Discontinued Medications














Generic Name Dose Route Start Last Admin





  Trade Name Freq  PRN Reason Stop Dose Admin


 


Morphine Sulfate  2 mg  03/15/18 20:19  03/15/18 20:31





  Morphine Injection -  IVPUSH  03/15/18 20:20  2 mg





  ONCE ONE   Administration














Medical Decision Making





- Medical Decision Making





03/15/18 22:39


Patient is a 62-year-old female with past medical history of CAD - AFib on 

Xeralto asthma, HTN, depression brought by son for complaints of right-sided 

abdominal pain 2 days. will r/o Appy, no likely to be ischemic bowel, divertic


morphine 2mg for pain


CT scan.








CT scan reviewed and d/w patient, and thickened cecum start on Cipro and Flagyl 

for 10 days and instructed to follow-up with GI.


Patient complains of pain will give Ultram and Motrin








03/16/18 01:38


Patient now has good relief of pain with the Ultram and motrin will continue at 

home








I discussed the physical exam findings, ancillary test results and final 

diagnoses with the patient. I answered all of the patient's questions. The 

patient was satisfied with the care received and felt comfortable with the 

discharge plan and treatment plan.  The Patient agrees to follow up with the 

primary care physician within 24-72 hours.











*DC/Admit/Observation/Transfer


Diagnosis at time of Disposition: 


 Abdominal pain








- Discharge Dispostion


Disposition: HOME


Condition at time of disposition: Stable





- Prescriptions


Prescriptions: 


Ciprofloxacin HCl [Cipro] 500 mg PO BID #20 tablet


Ibuprofen [Motrin -] 600 mg PO QID #28 tablet


Metronidazole 500 mg PO TID #30 tablet


Tramadol HCl [Ultram] 50 mg PO QID #20 tablet MDD 6





- Referrals


Referrals: 


Ketan Jiang MD [Staff Physician] - 





- Patient Instructions


Printed Discharge Instructions:  [facility] for Abdominal Pain-Adult


Additional Instructions: 


You CT scan shows inflammation in your bowel. This could be a sign of an 

infection and less likely, could be a sign of cancer. Your CT scan also shows 

that your bile duct is dilated, which could be from the removal of your 

gallbladder. We will treat this inflammation of your bowel with antibiotics but 

it is important to follow up with Dr. Jiang, the gastroenterologist or your 

own GI doctor,  to make sure that this inflammation has resolved and that this 

is not a sign of cancer or something else. Call Dr. Jiang's office for a 

follow up appointment within 1-2 weeks. Also, follow up with your primary care 

doctor in 2-3 days. Return to the emergency department if you have any new, 

worsening, or concerning symptoms.  we have provided copies of the cT scan and 

labs for you to present to your GI.





- Post Discharge Activity

## 2018-03-15 NOTE — PDOC
Rapid Medical Evaluation


Time Seen by Provider: 03/15/18 15:35


Medical Evaluation: 


 Allergies











Allergy/AdvReac Type Severity Reaction Status Date / Time


 


furosemide [From Lasix] Allergy Intermediate Itching Verified 04/10/17 06:33











03/15/18 15:35


62 year old female with a histroy of kidney stones s/p lithotripsy, Afib (on 

Xarelto), asthma, and gastritis, meds also suggestive of CHF (Lasix/

Spirinolactone) with 3 days of right flank pain radiating to the RLQ radiating 

and groin, 4 episodes of diarrhea. No fevers/chills, no nausea/vomiting. 





Very uncomfortable.


+Right CVA, +RLQ tenderness





-UA/culture


-Labs including CBC, CMP, PT/INR





-To Main ED for further evaluation.

## 2018-11-26 ENCOUNTER — HOSPITAL ENCOUNTER (EMERGENCY)
Dept: HOSPITAL 74 - JERFT | Age: 63
Discharge: HOME | End: 2018-11-26
Payer: COMMERCIAL

## 2018-11-26 VITALS — SYSTOLIC BLOOD PRESSURE: 125 MMHG | HEART RATE: 109 BPM | TEMPERATURE: 98.6 F | DIASTOLIC BLOOD PRESSURE: 80 MMHG

## 2018-11-26 VITALS — BODY MASS INDEX: 42.9 KG/M2

## 2018-11-26 DIAGNOSIS — J45.901: Primary | ICD-10-CM

## 2018-11-26 DIAGNOSIS — F32.9: ICD-10-CM

## 2018-11-26 DIAGNOSIS — F41.8: ICD-10-CM

## 2018-11-26 DIAGNOSIS — E78.00: ICD-10-CM

## 2018-11-26 DIAGNOSIS — Z79.01: ICD-10-CM

## 2018-11-26 DIAGNOSIS — I11.0: ICD-10-CM

## 2018-11-26 DIAGNOSIS — I50.9: ICD-10-CM

## 2018-11-26 DIAGNOSIS — I48.91: ICD-10-CM

## 2018-11-26 PROCEDURE — 3E0F7GC INTRODUCTION OF OTHER THERAPEUTIC SUBSTANCE INTO RESPIRATORY TRACT, VIA NATURAL OR ARTIFICIAL OPENING: ICD-10-PCS

## 2018-11-26 NOTE — PDOC
History of Present Illness





- General


Chief Complaint: Asthma


Stated Complaint: Asthma


Time Seen by Provider: 11/26/18 18:23





- History of Present Illness


Initial Comments: 





11/26/18 19:32


63-year-old female with a past medical history significant for hypertension and 

anxiety she's on xarelto toe as well presents for evaluation of cough 5 days 

without systemic symptoms. She was seen in an urgent care about a week ago 

placed on a course of Decadron given an inhaler but has minimal improvement





Past History





- Past Medical History


Allergies/Adverse Reactions: 


 Allergies











Allergy/AdvReac Type Severity Reaction Status Date / Time


 


furosemide [From Lasix] Allergy Intermediate Itching Verified 11/26/18 18:21











Home Medications: 


Ambulatory Orders





Albuterol Sulfate Inhaler - [Ventolin Hfa Inhaler -] 1 puff IH PRN 03/15/18 


Albuterol Sulfate [Proventil HFA Inhaler -] 1 - 2 inh PO PRN 03/15/18 


Citalopram Hydrobromide [Citalopram HBr] 10 mg PO DAILY 03/15/18 


Clonazepam 0.5 mg PO DAILY 03/15/18 


Omeprazole 20 mg PO DAILY 03/15/18 


Rivaroxaban [Xarelto -] 20 mg PO DAILY 03/15/18 


Spironolact/Hydrochlorothiazid [Spironolactone-Hctz 25-25 Tab] 1 tab PO DAILY 03

/15/18 


Verapamil HCl [Verapamil ER] 360 mg PO DAILY 03/15/18 


Metronidazole 500 mg PO TID #30 tablet 03/16/18 


Azithromycin [Zithromax -] 250 mg PO UTDICT #6 tab 11/26/18 








Asthma: Yes


Cancer: No


Cardiac Disorders: Yes (afib)


COPD: No


CHF: Yes


Dementia: No


Diabetes: No


GI Disorders: No (PUD)


 Disorders: No


HTN: Yes


Hypercholesterolemia: Yes


Liver Disease: No


Psychiatric Problems: Yes (depression)


Seizures: No





- Surgical History


Cardiac Surgery: Yes


Cholecystectomy: Yes


Orthopedic Surgery: No





- Immunization History


Immunization Up to Date: Yes





- Suicide/Smoking/Psychosocial Hx


Smoking History: Never smoked


Have you smoked in the past 12 months: No


Number of Cigarettes Smoked Daily: 6


If you are a former smoker, when did you quit?: 1986


'Breaking Loose' booklet given: 11/01/16


Hx Alcohol Use: No


Drug/Substance Use Hx: No


Substance Use Type: None


Hx Substance Use Treatment: No





**Review of Systems





- Review of Systems


Constitutional: No: Chills, Fever, Malaise, Night Sweats


Respiratory: Yes: Cough, Shortness of Breath





*Physical Exam





- Vital Signs


 Last Vital Signs











Temp Pulse Resp BP Pulse Ox


 


 98.6 F   109 H  16   125/80   97 


 


 11/26/18 18:22  11/26/18 18:22  11/26/18 18:22  11/26/18 18:22  11/26/18 18:22














- Physical Exam


Comments: 





11/26/18 19:33


HEAD: NC/AT


EYES: Conjuntiva clear


Ears: Canals and TM's normal


NOSE: No d/c


THROAT: Moist mucous membrances, oral pharanx clear, uvula midline


NECK: Supple without adenopathy


CARDIAC: S1 S2


LUNGS: Mild diffuse wheezing


ABDOMEN: Soft NT ND


MS: Full ROM in all joints without edema 


NEUROLOGIC: No gross sensory or motor deficits, NVID


SKIN: Normal color and temperature no lesions or rashes





ED Treatment Course





- Medications


Given in the ED: 


ED Medications














Discontinued Medications














Generic Name Dose Route Start Last Admin





  Trade Name Freq  PRN Reason Stop Dose Admin


 


Albuterol/Ipratropium  1 amp  11/26/18 18:24  11/26/18 18:57





  Duoneb -  NEB  11/26/18 18:25  1 amp





  ONCE ONE   Administration





     





     





     





     


 


Prednisone  60 mg  11/26/18 18:24  11/26/18 18:59





  Deltasone -  PO  11/26/18 18:25  Not Given





  ONCE ONE   





     





     





     





     














Medical Decision Making





- Medical Decision Making





11/26/18 20:22


Patient improved after DuoNeb mild left basilar rhonchi





*DC/Admit/Observation/Transfer


Diagnosis at time of Disposition: 


 Acute asthma exacerbation, Asthmatic bronchitis








- Discharge Dispostion


Disposition: HOME


Condition at time of disposition: Improved


Decision to Admit order: No





- Prescriptions


Prescriptions: 


Azithromycin [Zithromax -] 250 mg PO UTDICT #6 tab





- Referrals


Referrals: 


Jarrod Patton MD [Staff Physician] - 





- Patient Instructions


Printed Discharge Instructions:  DI for Pneumonia -- Adult, DI for Bronchiolitis


Additional Instructions: 


Return to the emergency room should symptoms worsen or go unresolved. Please 

continue to take your regular medications in the medication you were prescribed 

to at the urgent care. I've also ended antibiotic on fever as well. Please take 

that as directed follow-up with pulmonology in 2-3 days for further evaluation 

and treatment options.





- Post Discharge Activity

## 2018-11-26 NOTE — PDOC
Rapid Medical Evaluation


Chief Complaint: Asthma


Time Seen by Provider: 11/26/18 18:23


Medical Evaluation: 


 Allergies











Allergy/AdvReac Type Severity Reaction Status Date / Time


 


furosemide [From Lasix] Allergy Intermediate Itching Verified 11/26/18 18:21








 Vital Signs











Temp Pulse Resp BP Pulse Ox


 


 98.6 F   109 H  16   125/80   97 


 


 11/26/18 18:22  11/26/18 18:22  11/26/18 18:22  11/26/18 18:22  11/26/18 18:22











11/26/18 18:25


Pt c/o: wheezing coughing, no improvement with pump/nebulizers, no fever, no sob


pt on exam: LLL wheeze, vss


pt ordered for: cxr, duoneb, and prednisone


Pt to proceed to the ED





**Discharge Disposition





- Diagnosis


 Acute asthma exacerbation








- Referrals





- Patient Instructions





- Post Discharge Activity

## 2021-05-18 NOTE — PN
Progress Note, Physician


History of Present Illness: 


coughing





- Current Medication List


Current Medications: 


Active Medications





Albuterol Sulfate (Ventolin 0.083% Nebulizer Soln -)  1 amp NEB Q4H PRN


   PRN Reason: SHORT OF BREATH/WHEEZING


Albuterol/Ipratropium (Duoneb -)  1 amp NEB QIDR [facility]


   Last Admin: 04/12/17 11:53 Dose:  1 amp


Budesonide/Formoterol Fumarate (Symbicort 160/4.5mcg -)  1 puff IH DAILY [facility]


   Last Admin: 04/12/17 09:46 Dose:  1 puff


Citalopram Hydrobromide (Celexa -)  10 mg PO DAILY [facility]


   Last Admin: 04/12/17 09:42 Dose:  10 mg


Clonazepam (Klonopin -)  0.5 mg PO DAILY [facility]


   Last Admin: 04/12/17 09:43 Dose:  Not Given


Guaifenesin (Diabetic Tussin Dm -)  10 ml PO Q4H PRN


   PRN Reason: COUGH


Methylprednisolone Sodium Succinate (Solu-Medrol -)  60 mg IVPB Q8H-IV [facility]


   Last Admin: 04/12/17 18:22 Dose:  60 mg


Montelukast Sodium (Singulair -)  10 mg PO HS [facility]


   Last Admin: 04/11/17 21:37 Dose:  10 mg


Rivaroxaban (Xarelto -)  20 mg PO DAILY [facility]


   Last Admin: 04/12/17 09:45 Dose:  20 mg


Spironolactone (Aldactone -)  25 mg PO DAILY [facility]


   Last Admin: 04/12/17 09:42 Dose:  25 mg


Verapamil HCl (Calan Sr -)  240 mg PO BID [facility]


   Last Admin: 04/12/17 09:45 Dose:  240 mg











- Objective


Vital Signs: 


 Vital Signs











Temperature  98.1 F   04/12/17 16:47


 


Pulse Rate  81   04/12/17 16:47


 


Respiratory Rate  18   04/12/17 16:47


 


Blood Pressure  112/65   04/12/17 16:47


 


O2 Sat by Pulse Oximetry (%)  100   04/12/17 08:47











Constitutional: Yes: No Distress


HENT: Yes: Atraumatic


Neck: Yes: Supple


Cardiovascular: Yes: Regular Rate and Rhythm


Respiratory: Yes: Rhonchi


Gastrointestinal: Yes: Normal Bowel Sounds


Extremities: Yes: WNL


Neurological: Yes: Alert, Oriented


Labs: 


 CBC, BMP





 04/11/17 05:35 





 04/11/17 06:00 











Problem List





- Problems


(1) Acute asthma exacerbation


Assessment/Plan: 


duo nebs


iv steroids


Code(s): J45.901 - UNSPECIFIED ASTHMA WITH (ACUTE) EXACERBATION   





(2) CAD (coronary artery disease)


Assessment/Plan: 


stable


on meds


Code(s): I25.10 - ATHSCL HEART DISEASE OF NATIVE CORONARY ARTERY W/O ANG PCTRS 

  





(3) Chest pain


Code(s): R07.9 - CHEST PAIN, UNSPECIFIED   





(4) CHF exacerbation


Assessment/Plan: 


diuretics


stable


Code(s): I50.9 - HEART FAILURE, UNSPECIFIED   Qualifiers: 


     Congestive heart failure type: unspecified congestive heart failure type  

   Qualified Code(s): I50.9 - Heart failure, unspecified  





(5) HLD (hyperlipidemia)


Code(s): E78.5 - HYPERLIPIDEMIA, UNSPECIFIED   





(6) HTN (hypertension)


Code(s): I10 - ESSENTIAL (PRIMARY) HYPERTENSION Yes